# Patient Record
Sex: FEMALE | Race: BLACK OR AFRICAN AMERICAN | NOT HISPANIC OR LATINO | Employment: UNEMPLOYED | URBAN - METROPOLITAN AREA
[De-identification: names, ages, dates, MRNs, and addresses within clinical notes are randomized per-mention and may not be internally consistent; named-entity substitution may affect disease eponyms.]

---

## 2023-12-04 PROCEDURE — 99285 EMERGENCY DEPT VISIT HI MDM: CPT | Performed by: EMERGENCY MEDICINE

## 2023-12-05 ENCOUNTER — HOSPITAL ENCOUNTER (OUTPATIENT)
Facility: HOSPITAL | Age: 28
Setting detail: OBSERVATION
Discharge: LEFT BEFORE DISCHARGE | End: 2023-12-06
Attending: EMERGENCY MEDICINE | Admitting: INTERNAL MEDICINE
Payer: COMMERCIAL

## 2023-12-05 ENCOUNTER — APPOINTMENT (OUTPATIENT)
Dept: RADIOLOGY | Facility: HOSPITAL | Age: 28
End: 2023-12-05
Payer: COMMERCIAL

## 2023-12-05 ENCOUNTER — APPOINTMENT (OUTPATIENT)
Dept: NEUROLOGY | Facility: HOSPITAL | Age: 28
End: 2023-12-05
Payer: COMMERCIAL

## 2023-12-05 DIAGNOSIS — R41.82 ALTERED MENTAL STATUS, UNSPECIFIED ALTERED MENTAL STATUS TYPE: Primary | ICD-10-CM

## 2023-12-05 LAB
ALBUMIN SERPL BCP-MCNC: 3.4 G/DL (ref 3.4–5)
ALBUMIN SERPL BCP-MCNC: 3.6 G/DL (ref 3.4–5)
ALP SERPL-CCNC: 47 U/L (ref 33–110)
ALP SERPL-CCNC: 53 U/L (ref 33–110)
ALT SERPL W P-5'-P-CCNC: 6 U/L (ref 7–45)
ALT SERPL W P-5'-P-CCNC: 7 U/L (ref 7–45)
AMPHETAMINES UR QL SCN: ABNORMAL
ANION GAP BLDV CALCULATED.4IONS-SCNC: 8 MMOL/L (ref 10–25)
ANION GAP SERPL CALC-SCNC: 13 MMOL/L (ref 10–20)
ANION GAP SERPL CALC-SCNC: 7 MMOL/L (ref 10–20)
APAP SERPL-MCNC: <10 UG/ML
APPEARANCE UR: CLEAR
AST SERPL W P-5'-P-CCNC: 14 U/L (ref 9–39)
AST SERPL W P-5'-P-CCNC: 17 U/L (ref 9–39)
B-HCG SERPL-ACNC: <2 MIU/ML
BARBITURATES UR QL SCN: ABNORMAL
BASE EXCESS BLDV CALC-SCNC: 1.6 MMOL/L (ref -2–3)
BASOPHILS # BLD AUTO: 0.03 X10*3/UL (ref 0–0.1)
BASOPHILS NFR BLD AUTO: 0.5 %
BENZODIAZ UR QL SCN: ABNORMAL
BILIRUB SERPL-MCNC: 0.5 MG/DL (ref 0–1.2)
BILIRUB SERPL-MCNC: 0.8 MG/DL (ref 0–1.2)
BILIRUB UR STRIP.AUTO-MCNC: NEGATIVE MG/DL
BODY TEMPERATURE: 37 DEGREES CELSIUS
BUN SERPL-MCNC: 9 MG/DL (ref 6–23)
BUN SERPL-MCNC: 9 MG/DL (ref 6–23)
BZE UR QL SCN: ABNORMAL
CA-I BLDV-SCNC: 1.17 MMOL/L (ref 1.1–1.33)
CALCIUM SERPL-MCNC: 8.3 MG/DL (ref 8.6–10.3)
CALCIUM SERPL-MCNC: 8.5 MG/DL (ref 8.6–10.3)
CANNABINOIDS UR QL SCN: ABNORMAL
CHLORIDE BLDV-SCNC: 104 MMOL/L (ref 98–107)
CHLORIDE SERPL-SCNC: 104 MMOL/L (ref 98–107)
CHLORIDE SERPL-SCNC: 99 MMOL/L (ref 98–107)
CO2 SERPL-SCNC: 25 MMOL/L (ref 21–32)
CO2 SERPL-SCNC: 30 MMOL/L (ref 21–32)
COLOR UR: YELLOW
CREAT SERPL-MCNC: 0.68 MG/DL (ref 0.5–1.05)
CREAT SERPL-MCNC: 0.71 MG/DL (ref 0.5–1.05)
EOSINOPHIL # BLD AUTO: 0.03 X10*3/UL (ref 0–0.7)
EOSINOPHIL NFR BLD AUTO: 0.5 %
ERYTHROCYTE [DISTWIDTH] IN BLOOD BY AUTOMATED COUNT: 14.4 % (ref 11.5–14.5)
ERYTHROCYTE [DISTWIDTH] IN BLOOD BY AUTOMATED COUNT: 14.5 % (ref 11.5–14.5)
EST. AVERAGE GLUCOSE BLD GHB EST-MCNC: 103 MG/DL
FENTANYL+NORFENTANYL UR QL SCN: ABNORMAL
GFR SERPL CREATININE-BSD FRML MDRD: >90 ML/MIN/1.73M*2
GFR SERPL CREATININE-BSD FRML MDRD: >90 ML/MIN/1.73M*2
GLUCOSE BLD MANUAL STRIP-MCNC: 85 MG/DL (ref 74–99)
GLUCOSE BLDV-MCNC: 80 MG/DL (ref 74–99)
GLUCOSE SERPL-MCNC: 84 MG/DL (ref 74–99)
GLUCOSE SERPL-MCNC: 85 MG/DL (ref 74–99)
GLUCOSE UR STRIP.AUTO-MCNC: NEGATIVE MG/DL
HBA1C MFR BLD: 5.2 %
HCO3 BLDV-SCNC: 27.2 MMOL/L (ref 22–26)
HCT VFR BLD AUTO: 35.2 % (ref 36–46)
HCT VFR BLD AUTO: 37.9 % (ref 36–46)
HCT VFR BLD EST: 35 % (ref 36–46)
HGB BLD-MCNC: 10.9 G/DL (ref 12–16)
HGB BLD-MCNC: 11.6 G/DL (ref 12–16)
HGB BLDV-MCNC: 11.5 G/DL (ref 12–16)
HOLD SPECIMEN: NORMAL
IMM GRANULOCYTES # BLD AUTO: 0.02 X10*3/UL (ref 0–0.7)
IMM GRANULOCYTES NFR BLD AUTO: 0.3 % (ref 0–0.9)
INHALED O2 CONCENTRATION: 21 %
KETONES UR STRIP.AUTO-MCNC: NEGATIVE MG/DL
LACTATE BLDV-SCNC: 0.7 MMOL/L (ref 0.4–2)
LACTATE SERPL-SCNC: 0.6 MMOL/L (ref 0.4–2)
LEUKOCYTE ESTERASE UR QL STRIP.AUTO: NEGATIVE
LYMPHOCYTES # BLD AUTO: 1.82 X10*3/UL (ref 1.2–4.8)
LYMPHOCYTES NFR BLD AUTO: 30.7 %
MAGNESIUM SERPL-MCNC: 1.6 MG/DL (ref 1.6–2.4)
MAGNESIUM SERPL-MCNC: 1.6 MG/DL (ref 1.6–2.4)
MCH RBC QN AUTO: 26.6 PG (ref 26–34)
MCH RBC QN AUTO: 26.8 PG (ref 26–34)
MCHC RBC AUTO-ENTMCNC: 30.6 G/DL (ref 32–36)
MCHC RBC AUTO-ENTMCNC: 31 G/DL (ref 32–36)
MCV RBC AUTO: 87 FL (ref 80–100)
MCV RBC AUTO: 87 FL (ref 80–100)
MONOCYTES # BLD AUTO: 0.47 X10*3/UL (ref 0.1–1)
MONOCYTES NFR BLD AUTO: 7.9 %
NEUTROPHILS # BLD AUTO: 3.55 X10*3/UL (ref 1.2–7.7)
NEUTROPHILS NFR BLD AUTO: 60.1 %
NITRITE UR QL STRIP.AUTO: NEGATIVE
NRBC BLD-RTO: 0 /100 WBCS (ref 0–0)
NRBC BLD-RTO: 0 /100 WBCS (ref 0–0)
OPIATES UR QL SCN: ABNORMAL
OXYCODONE+OXYMORPHONE UR QL SCN: ABNORMAL
OXYHGB MFR BLDV: 82.3 % (ref 45–75)
PCO2 BLDV: 46 MM HG (ref 41–51)
PCP UR QL SCN: ABNORMAL
PH BLDV: 7.38 PH (ref 7.33–7.43)
PH UR STRIP.AUTO: 6.5 [PH]
PLATELET # BLD AUTO: 290 X10*3/UL (ref 150–450)
PLATELET # BLD AUTO: 298 X10*3/UL (ref 150–450)
PO2 BLDV: 52 MM HG (ref 35–45)
POTASSIUM BLDV-SCNC: 3.5 MMOL/L (ref 3.5–5.3)
POTASSIUM SERPL-SCNC: 3.5 MMOL/L (ref 3.5–5.3)
POTASSIUM SERPL-SCNC: 3.5 MMOL/L (ref 3.5–5.3)
PROT SERPL-MCNC: 5.8 G/DL (ref 6.4–8.2)
PROT SERPL-MCNC: 6.2 G/DL (ref 6.4–8.2)
PROT UR STRIP.AUTO-MCNC: ABNORMAL MG/DL
RBC # BLD AUTO: 4.07 X10*6/UL (ref 4–5.2)
RBC # BLD AUTO: 4.36 X10*6/UL (ref 4–5.2)
RBC # UR STRIP.AUTO: NEGATIVE /UL
RBC #/AREA URNS AUTO: NORMAL /HPF
SALICYLATES SERPL-MCNC: <3 MG/DL
SAO2 % BLDV: 84 % (ref 45–75)
SARS-COV-2 RNA RESP QL NAA+PROBE: NOT DETECTED
SODIUM BLDV-SCNC: 136 MMOL/L (ref 136–145)
SODIUM SERPL-SCNC: 132 MMOL/L (ref 136–145)
SODIUM SERPL-SCNC: 138 MMOL/L (ref 136–145)
SP GR UR STRIP.AUTO: <1.005
SQUAMOUS #/AREA URNS AUTO: NORMAL /HPF
UROBILINOGEN UR STRIP.AUTO-MCNC: ABNORMAL MG/DL
WBC # BLD AUTO: 5.5 X10*3/UL (ref 4.4–11.3)
WBC # BLD AUTO: 5.9 X10*3/UL (ref 4.4–11.3)
WBC #/AREA URNS AUTO: NORMAL /HPF

## 2023-12-05 PROCEDURE — 95819 EEG AWAKE AND ASLEEP: CPT

## 2023-12-05 PROCEDURE — 70498 CT ANGIOGRAPHY NECK: CPT | Performed by: STUDENT IN AN ORGANIZED HEALTH CARE EDUCATION/TRAINING PROGRAM

## 2023-12-05 PROCEDURE — 70496 CT ANGIOGRAPHY HEAD: CPT

## 2023-12-05 PROCEDURE — 96361 HYDRATE IV INFUSION ADD-ON: CPT | Performed by: INTERNAL MEDICINE

## 2023-12-05 PROCEDURE — 94760 N-INVAS EAR/PLS OXIMETRY 1: CPT

## 2023-12-05 PROCEDURE — 80053 COMPREHEN METABOLIC PANEL: CPT | Mod: 59 | Performed by: EMERGENCY MEDICINE

## 2023-12-05 PROCEDURE — 83735 ASSAY OF MAGNESIUM: CPT | Performed by: PHYSICIAN ASSISTANT

## 2023-12-05 PROCEDURE — 81001 URINALYSIS AUTO W/SCOPE: CPT | Performed by: EMERGENCY MEDICINE

## 2023-12-05 PROCEDURE — 80307 DRUG TEST PRSMV CHEM ANLYZR: CPT | Performed by: EMERGENCY MEDICINE

## 2023-12-05 PROCEDURE — 70496 CT ANGIOGRAPHY HEAD: CPT | Performed by: STUDENT IN AN ORGANIZED HEALTH CARE EDUCATION/TRAINING PROGRAM

## 2023-12-05 PROCEDURE — 83036 HEMOGLOBIN GLYCOSYLATED A1C: CPT | Mod: AHULAB | Performed by: PHYSICIAN ASSISTANT

## 2023-12-05 PROCEDURE — 85027 COMPLETE CBC AUTOMATED: CPT | Performed by: PHYSICIAN ASSISTANT

## 2023-12-05 PROCEDURE — 80179 DRUG ASSAY SALICYLATE: CPT | Performed by: EMERGENCY MEDICINE

## 2023-12-05 PROCEDURE — 84702 CHORIONIC GONADOTROPIN TEST: CPT | Performed by: EMERGENCY MEDICINE

## 2023-12-05 PROCEDURE — 83605 ASSAY OF LACTIC ACID: CPT | Performed by: EMERGENCY MEDICINE

## 2023-12-05 PROCEDURE — C9113 INJ PANTOPRAZOLE SODIUM, VIA: HCPCS | Performed by: PHYSICIAN ASSISTANT

## 2023-12-05 PROCEDURE — 82435 ASSAY OF BLOOD CHLORIDE: CPT | Performed by: PHYSICIAN ASSISTANT

## 2023-12-05 PROCEDURE — 96375 TX/PRO/DX INJ NEW DRUG ADDON: CPT | Mod: 59 | Performed by: INTERNAL MEDICINE

## 2023-12-05 PROCEDURE — 36415 COLL VENOUS BLD VENIPUNCTURE: CPT | Performed by: PHYSICIAN ASSISTANT

## 2023-12-05 PROCEDURE — 70551 MRI BRAIN STEM W/O DYE: CPT | Performed by: STUDENT IN AN ORGANIZED HEALTH CARE EDUCATION/TRAINING PROGRAM

## 2023-12-05 PROCEDURE — 2550000001 HC RX 255 CONTRASTS: Performed by: EMERGENCY MEDICINE

## 2023-12-05 PROCEDURE — 84132 ASSAY OF SERUM POTASSIUM: CPT | Mod: 59 | Performed by: PHYSICIAN ASSISTANT

## 2023-12-05 PROCEDURE — 80143 DRUG ASSAY ACETAMINOPHEN: CPT | Performed by: EMERGENCY MEDICINE

## 2023-12-05 PROCEDURE — G0378 HOSPITAL OBSERVATION PER HR: HCPCS

## 2023-12-05 PROCEDURE — 99221 1ST HOSP IP/OBS SF/LOW 40: CPT | Performed by: PSYCHIATRY & NEUROLOGY

## 2023-12-05 PROCEDURE — 36415 COLL VENOUS BLD VENIPUNCTURE: CPT | Performed by: EMERGENCY MEDICINE

## 2023-12-05 PROCEDURE — 2500000004 HC RX 250 GENERAL PHARMACY W/ HCPCS (ALT 636 FOR OP/ED): Performed by: PHYSICIAN ASSISTANT

## 2023-12-05 PROCEDURE — 70450 CT HEAD/BRAIN W/O DYE: CPT

## 2023-12-05 PROCEDURE — 82805 BLOOD GASES W/O2 SATURATION: CPT | Performed by: EMERGENCY MEDICINE

## 2023-12-05 PROCEDURE — 70551 MRI BRAIN STEM W/O DYE: CPT

## 2023-12-05 PROCEDURE — 82947 ASSAY GLUCOSE BLOOD QUANT: CPT

## 2023-12-05 PROCEDURE — 84295 ASSAY OF SERUM SODIUM: CPT | Performed by: EMERGENCY MEDICINE

## 2023-12-05 PROCEDURE — 83735 ASSAY OF MAGNESIUM: CPT | Performed by: EMERGENCY MEDICINE

## 2023-12-05 PROCEDURE — 80053 COMPREHEN METABOLIC PANEL: CPT | Performed by: EMERGENCY MEDICINE

## 2023-12-05 PROCEDURE — 99223 1ST HOSP IP/OBS HIGH 75: CPT | Performed by: PHYSICIAN ASSISTANT

## 2023-12-05 PROCEDURE — 85025 COMPLETE CBC W/AUTO DIFF WBC: CPT | Performed by: EMERGENCY MEDICINE

## 2023-12-05 PROCEDURE — 87635 SARS-COV-2 COVID-19 AMP PRB: CPT | Performed by: EMERGENCY MEDICINE

## 2023-12-05 PROCEDURE — 70498 CT ANGIOGRAPHY NECK: CPT

## 2023-12-05 RX ORDER — PANTOPRAZOLE SODIUM 40 MG/10ML
40 INJECTION, POWDER, LYOPHILIZED, FOR SOLUTION INTRAVENOUS
Status: DISCONTINUED | OUTPATIENT
Start: 2023-12-05 | End: 2023-12-06 | Stop reason: HOSPADM

## 2023-12-05 RX ORDER — TALC
3 POWDER (GRAM) TOPICAL
Status: DISCONTINUED | OUTPATIENT
Start: 2023-12-05 | End: 2023-12-06 | Stop reason: HOSPADM

## 2023-12-05 RX ORDER — DOCUSATE SODIUM 100 MG/1
100 CAPSULE, LIQUID FILLED ORAL 2 TIMES DAILY PRN
Status: DISCONTINUED | OUTPATIENT
Start: 2023-12-05 | End: 2023-12-06 | Stop reason: HOSPADM

## 2023-12-05 RX ORDER — PANTOPRAZOLE SODIUM 40 MG/1
40 TABLET, DELAYED RELEASE ORAL
Status: DISCONTINUED | OUTPATIENT
Start: 2023-12-05 | End: 2023-12-06 | Stop reason: HOSPADM

## 2023-12-05 RX ORDER — ACETAMINOPHEN 325 MG/1
950 TABLET ORAL EVERY 6 HOURS PRN
Status: DISCONTINUED | OUTPATIENT
Start: 2023-12-05 | End: 2023-12-06 | Stop reason: HOSPADM

## 2023-12-05 RX ADMIN — PANTOPRAZOLE SODIUM 40 MG: 40 INJECTION, POWDER, FOR SOLUTION INTRAVENOUS at 09:58

## 2023-12-05 RX ADMIN — IOHEXOL 75 ML: 350 INJECTION, SOLUTION INTRAVENOUS at 00:42

## 2023-12-05 RX ADMIN — SODIUM CHLORIDE 500 ML: 9 INJECTION, SOLUTION INTRAVENOUS at 13:05

## 2023-12-05 SDOH — SOCIAL STABILITY: SOCIAL INSECURITY: DO YOU FEEL ANYONE HAS EXPLOITED OR TAKEN ADVANTAGE OF YOU FINANCIALLY OR OF YOUR PERSONAL PROPERTY?: NO

## 2023-12-05 SDOH — ECONOMIC STABILITY: TRANSPORTATION INSECURITY
IN THE PAST 12 MONTHS, HAS LACK OF TRANSPORTATION KEPT YOU FROM MEETINGS, WORK, OR FROM GETTING THINGS NEEDED FOR DAILY LIVING?: NO

## 2023-12-05 SDOH — SOCIAL STABILITY: SOCIAL INSECURITY: HAS ANYONE EVER THREATENED TO HURT YOUR FAMILY OR YOUR PETS?: NO

## 2023-12-05 SDOH — ECONOMIC STABILITY: HOUSING INSECURITY
IN THE LAST 12 MONTHS, WAS THERE A TIME WHEN YOU DID NOT HAVE A STEADY PLACE TO SLEEP OR SLEPT IN A SHELTER (INCLUDING NOW)?: NO

## 2023-12-05 SDOH — ECONOMIC STABILITY: INCOME INSECURITY: IN THE LAST 12 MONTHS, WAS THERE A TIME WHEN YOU WERE NOT ABLE TO PAY THE MORTGAGE OR RENT ON TIME?: NO

## 2023-12-05 SDOH — SOCIAL STABILITY: SOCIAL INSECURITY: DO YOU FEEL UNSAFE GOING BACK TO THE PLACE WHERE YOU ARE LIVING?: NO

## 2023-12-05 SDOH — SOCIAL STABILITY: SOCIAL INSECURITY: ARE YOU OR HAVE YOU BEEN THREATENED OR ABUSED PHYSICALLY, EMOTIONALLY, OR SEXUALLY BY ANYONE?: NO

## 2023-12-05 SDOH — SOCIAL STABILITY: SOCIAL INSECURITY: DOES ANYONE TRY TO KEEP YOU FROM HAVING/CONTACTING OTHER FRIENDS OR DOING THINGS OUTSIDE YOUR HOME?: NO

## 2023-12-05 SDOH — ECONOMIC STABILITY: GENERAL
WHICH OF THE FOLLOWING DO YOU KNOW HOW TO USE AND HAVE ACCESS TO EVERY DAY? (CHOOSE ALL THAT APPLY): DESKTOP COMPUTER, LAPTOP COMPUTER, OR TABLET WITH BROADBAND INTERNET CONNECTION;SMARTPHONE WITH CELLULAR DATA PLAN

## 2023-12-05 SDOH — ECONOMIC STABILITY: HOUSING INSECURITY: IN THE LAST 12 MONTHS, HOW MANY PLACES HAVE YOU LIVED?: 1

## 2023-12-05 SDOH — SOCIAL STABILITY: SOCIAL INSECURITY: HAVE YOU HAD THOUGHTS OF HARMING ANYONE ELSE?: NO

## 2023-12-05 SDOH — ECONOMIC STABILITY: TRANSPORTATION INSECURITY
IN THE PAST 12 MONTHS, HAS THE LACK OF TRANSPORTATION KEPT YOU FROM MEDICAL APPOINTMENTS OR FROM GETTING MEDICATIONS?: NO

## 2023-12-05 SDOH — ECONOMIC STABILITY: GENERAL
WHICH OF THE FOLLOWING WOULD YOU LIKE TO GET CONNECTED TO IN ORDER TO RECEIVE A DISCOUNT OR FOR FREE? (CHOOSE ALL THAT APPLY): NONE OF THESE

## 2023-12-05 SDOH — SOCIAL STABILITY: SOCIAL INSECURITY: ARE THERE ANY APPARENT SIGNS OF INJURIES/BEHAVIORS THAT COULD BE RELATED TO ABUSE/NEGLECT?: NO

## 2023-12-05 SDOH — SOCIAL STABILITY: SOCIAL INSECURITY: ABUSE: ADULT

## 2023-12-05 ASSESSMENT — COGNITIVE AND FUNCTIONAL STATUS - GENERAL
PERSONAL GROOMING: A LITTLE
MOBILITY SCORE: 12
MOVING FROM LYING ON BACK TO SITTING ON SIDE OF FLAT BED WITH BEDRAILS: A LITTLE
DRESSING REGULAR UPPER BODY CLOTHING: A LITTLE
PERSONAL GROOMING: A LITTLE
DRESSING REGULAR LOWER BODY CLOTHING: A LITTLE
TURNING FROM BACK TO SIDE WHILE IN FLAT BAD: A LITTLE
MOVING TO AND FROM BED TO CHAIR: A LOT
CLIMB 3 TO 5 STEPS WITH RAILING: TOTAL
WALKING IN HOSPITAL ROOM: TOTAL
DRESSING REGULAR UPPER BODY CLOTHING: A LITTLE
MOVING FROM LYING ON BACK TO SITTING ON SIDE OF FLAT BED WITH BEDRAILS: A LITTLE
TURNING FROM BACK TO SIDE WHILE IN FLAT BAD: A LITTLE
CLIMB 3 TO 5 STEPS WITH RAILING: TOTAL
MOVING TO AND FROM BED TO CHAIR: A LOT
WALKING IN HOSPITAL ROOM: TOTAL
HELP NEEDED FOR BATHING: A LITTLE
DAILY ACTIVITIY SCORE: 19
TOILETING: A LITTLE
TOILETING: A LITTLE
STANDING UP FROM CHAIR USING ARMS: A LOT
STANDING UP FROM CHAIR USING ARMS: A LOT
DAILY ACTIVITIY SCORE: 19
HELP NEEDED FOR BATHING: A LITTLE
PATIENT BASELINE BEDBOUND: NO
DRESSING REGULAR LOWER BODY CLOTHING: A LITTLE
MOBILITY SCORE: 12

## 2023-12-05 ASSESSMENT — ACTIVITIES OF DAILY LIVING (ADL)
DRESSING YOURSELF: NEEDS ASSISTANCE
LACK_OF_TRANSPORTATION: NO
TOILETING: NEEDS ASSISTANCE
HEARING - LEFT EAR: FUNCTIONAL
GROOMING: NEEDS ASSISTANCE
PATIENT'S MEMORY ADEQUATE TO SAFELY COMPLETE DAILY ACTIVITIES?: NO
ADEQUATE_TO_COMPLETE_ADL: YES
BATHING: NEEDS ASSISTANCE
LACK_OF_TRANSPORTATION: NO
WALKS IN HOME: INDEPENDENT
HEARING - RIGHT EAR: FUNCTIONAL
FEEDING YOURSELF: NEEDS ASSISTANCE
JUDGMENT_ADEQUATE_SAFELY_COMPLETE_DAILY_ACTIVITIES: NO

## 2023-12-05 ASSESSMENT — LIFESTYLE VARIABLES
SKIP TO QUESTIONS 9-10: 1
AUDIT-C TOTAL SCORE: 1
HOW MANY STANDARD DRINKS CONTAINING ALCOHOL DO YOU HAVE ON A TYPICAL DAY: 1 OR 2
AUDIT-C TOTAL SCORE: 1
HOW OFTEN DO YOU HAVE 6 OR MORE DRINKS ON ONE OCCASION: NEVER
HOW OFTEN DO YOU HAVE A DRINK CONTAINING ALCOHOL: MONTHLY OR LESS

## 2023-12-05 ASSESSMENT — PAIN SCALES - GENERAL
PAINLEVEL_OUTOF10: 0 - NO PAIN
PAINLEVEL_OUTOF10: 0 - NO PAIN

## 2023-12-05 ASSESSMENT — PAIN - FUNCTIONAL ASSESSMENT
PAIN_FUNCTIONAL_ASSESSMENT: 0-10
PAIN_FUNCTIONAL_ASSESSMENT: UNABLE TO SELF-REPORT
PAIN_FUNCTIONAL_ASSESSMENT: 0-10

## 2023-12-05 ASSESSMENT — ENCOUNTER SYMPTOMS: CONFUSION: 1

## 2023-12-05 ASSESSMENT — PATIENT HEALTH QUESTIONNAIRE - PHQ9
2. FEELING DOWN, DEPRESSED OR HOPELESS: NOT AT ALL
SUM OF ALL RESPONSES TO PHQ9 QUESTIONS 1 & 2: 0
1. LITTLE INTEREST OR PLEASURE IN DOING THINGS: NOT AT ALL

## 2023-12-05 ASSESSMENT — COLUMBIA-SUICIDE SEVERITY RATING SCALE - C-SSRS
1. IN THE PAST MONTH, HAVE YOU WISHED YOU WERE DEAD OR WISHED YOU COULD GO TO SLEEP AND NOT WAKE UP?: NO
6. HAVE YOU EVER DONE ANYTHING, STARTED TO DO ANYTHING, OR PREPARED TO DO ANYTHING TO END YOUR LIFE?: NO
2. HAVE YOU ACTUALLY HAD ANY THOUGHTS OF KILLING YOURSELF?: NO

## 2023-12-05 NOTE — ED TRIAGE NOTES
1830 pm lkw. 2130 sx witnessed by family. 85 bgl, vitals wnl. Dilated pupils, reactive to light. A&ox1. No hx of seizures, peed herself. Unable to speak but seems like she wants to. No allergies. Family at bedside showed a bottle of modafinil and a antidepressant. Bilateral arm weakness, aphasia, some weakness noted in right leg. No facial droop.

## 2023-12-05 NOTE — PROGRESS NOTES
Mariann Dave is a 28 y.o. female on day 0 of admission presenting with AMS (altered mental status).    Subjective   Patient found trying to sit up in bed, however unable to do so. She is leaning forward over her breakfast. She appears confused, but is able to tell me where she is, what day of the week it is, and what the date is. She mentions that she works in [employer] for a [employer]. She does think her speech sounds different to her. She tells me she took a benzodiazapene, but also mention she took something intranasally which she calls [facility] or FTE? She says it is a 'research drug', either snorted or taken intranasally.        Objective     Physical Exam  Constitutional:       General: She is not in acute distress.     Appearance: She is not toxic-appearing.   HENT:      Head: Normocephalic and atraumatic.      Right Ear: External ear normal.      Left Ear: External ear normal.      Nose: Nose normal. No congestion.      Mouth/Throat:      Mouth: Mucous membranes are moist.      Pharynx: Oropharynx is clear.   Eyes:      General:         Right eye: No discharge.         Left eye: No discharge.      Conjunctiva/sclera: Conjunctivae normal.      Pupils: Pupils are equal, round, and reactive to light.   Cardiovascular:      Rate and Rhythm: Normal rate and regular rhythm.      Heart sounds: No murmur heard.     No gallop.   Pulmonary:      Effort: Pulmonary effort is normal.      Breath sounds: No wheezing or rales.   Abdominal:      General: Abdomen is flat. Bowel sounds are normal.      Palpations: Abdomen is soft.      Tenderness: There is no abdominal tenderness. There is no guarding or rebound.   Musculoskeletal:         General: No swelling or tenderness. Normal range of motion.      Right lower leg: No edema.      Left lower leg: No edema.   Skin:     General: Skin is warm and dry.      Findings: No erythema or rash.   Neurological:      General: No focal deficit present.      Mental Status: She is  "alert.      Cranial Nerves: No cranial nerve deficit.      Sensory: No sensory deficit.      Motor: Weakness present.      Coordination: Coordination abnormal.      Comments: Patient is alert and oriented to person, place and time. However, she has limited recall of events leading up to her hospitalization. Unsure how she got to ED or who took her here. Unsure why she is in ED. Mentation delayed. Slurred speech noted. Head turn and shoulder shrug intact but delayed. PERRL, no nystagmus noted, difficulty with convergence. Weakness noted bilaterally upper and lower extremities, does not lateralize.    Psychiatric:         Mood and Affect: Mood normal.         Thought Content: Thought content normal.         Last Recorded Vitals  Blood pressure 109/83, pulse 84, temperature 36.7 °C (98 °F), temperature source Temporal, resp. rate 21, height 1.575 m (5' 2\"), weight 72.6 kg (160 lb), SpO2 99 %.  Intake/Output last 3 Shifts:  No intake/output data recorded.    Relevant Results              Scheduled medications  melatonin, 3 mg, oral, Daily  pantoprazole, 40 mg, oral, Daily before breakfast   Or  pantoprazole, 40 mg, intravenous, Daily before breakfast      Continuous medications     PRN medications  PRN medications: acetaminophen, docusate sodium  Results for orders placed or performed during the hospital encounter of 12/05/23 (from the past 24 hour(s))   POCT GLUCOSE   Result Value Ref Range    POCT Glucose 85 74 - 99 mg/dL   CBC and Auto Differential   Result Value Ref Range    WBC 5.9 4.4 - 11.3 x10*3/uL    nRBC 0.0 0.0 - 0.0 /100 WBCs    RBC 4.07 4.00 - 5.20 x10*6/uL    Hemoglobin 10.9 (L) 12.0 - 16.0 g/dL    Hematocrit 35.2 (L) 36.0 - 46.0 %    MCV 87 80 - 100 fL    MCH 26.8 26.0 - 34.0 pg    MCHC 31.0 (L) 32.0 - 36.0 g/dL    RDW 14.4 11.5 - 14.5 %    Platelets 298 150 - 450 x10*3/uL    Neutrophils % 60.1 40.0 - 80.0 %    Immature Granulocytes %, Automated 0.3 0.0 - 0.9 %    Lymphocytes % 30.7 13.0 - 44.0 %    " Monocytes % 7.9 2.0 - 10.0 %    Eosinophils % 0.5 0.0 - 6.0 %    Basophils % 0.5 0.0 - 2.0 %    Neutrophils Absolute 3.55 1.20 - 7.70 x10*3/uL    Immature Granulocytes Absolute, Automated 0.02 0.00 - 0.70 x10*3/uL    Lymphocytes Absolute 1.82 1.20 - 4.80 x10*3/uL    Monocytes Absolute 0.47 0.10 - 1.00 x10*3/uL    Eosinophils Absolute 0.03 0.00 - 0.70 x10*3/uL    Basophils Absolute 0.03 0.00 - 0.10 x10*3/uL   Comprehensive metabolic panel   Result Value Ref Range    Glucose 85 74 - 99 mg/dL    Sodium 132 (L) 136 - 145 mmol/L    Potassium 3.5 3.5 - 5.3 mmol/L    Chloride 99 98 - 107 mmol/L    Bicarbonate 30 21 - 32 mmol/L    Anion Gap 7 (L) 10 - 20 mmol/L    Urea Nitrogen 9 6 - 23 mg/dL    Creatinine 0.68 0.50 - 1.05 mg/dL    eGFR >90 >60 mL/min/1.73m*2    Calcium 8.3 (L) 8.6 - 10.3 mg/dL    Albumin 3.4 3.4 - 5.0 g/dL    Alkaline Phosphatase 47 33 - 110 U/L    Total Protein 5.8 (L) 6.4 - 8.2 g/dL    AST 14 9 - 39 U/L    Bilirubin, Total 0.5 0.0 - 1.2 mg/dL    ALT 6 (L) 7 - 45 U/L   Magnesium   Result Value Ref Range    Magnesium 1.60 1.60 - 2.40 mg/dL   Blood Gas Venous Full Panel   Result Value Ref Range    POCT pH, Venous 7.38 7.33 - 7.43 pH    POCT pCO2, Venous 46 41 - 51 mm Hg    POCT pO2, Venous 52 (H) 35 - 45 mm Hg    POCT SO2, Venous 84 (H) 45 - 75 %    POCT Oxy Hemoglobin, Venous 82.3 (H) 45.0 - 75.0 %    POCT Hematocrit Calculated, Venous 35.0 (L) 36.0 - 46.0 %    POCT Sodium, Venous 136 136 - 145 mmol/L    POCT Potassium, Venous 3.5 3.5 - 5.3 mmol/L    POCT Chloride, Venous 104 98 - 107 mmol/L    POCT Ionized Calicum, Venous 1.17 1.10 - 1.33 mmol/L    POCT Glucose, Venous 80 74 - 99 mg/dL    POCT Lactate, Venous 0.7 0.4 - 2.0 mmol/L    POCT Base Excess, Venous 1.6 -2.0 - 3.0 mmol/L    POCT HCO3 Calculated, Venous 27.2 (H) 22.0 - 26.0 mmol/L    POCT Hemoglobin, Venous 11.5 (L) 12.0 - 16.0 g/dL    POCT Anion Gap, Venous 8.0 (L) 10.0 - 25.0 mmol/L    Patient Temperature 37.0 degrees Celsius    FiO2 21 %    Lactate   Result Value Ref Range    Lactate 0.6 0.4 - 2.0 mmol/L   Human Chorionic Gonadotropin, Serum Quantitative   Result Value Ref Range    HCG, Beta-Quantitative <2 <5 mIU/mL   Salicylate level   Result Value Ref Range    Salicylate  <3 4 - 20 mg/dL   Acetaminophen level   Result Value Ref Range    Acetaminophen <10.0 10.0 - 30.0 ug/mL   Light Blue Top   Result Value Ref Range    Extra Tube Hold for add-ons.    SARS-CoV-2 RT PCR   Result Value Ref Range    Coronavirus 2019, PCR Not Detected Not Detected   Urinalysis with Reflex Microscopic   Result Value Ref Range    Color, Urine Yellow Straw, Yellow    Appearance, Urine Clear Clear    Specific Gravity, Urine <1.005 (A) 1.005 - 1.035    pH, Urine 6.5 5.0, 5.5, 6.0, 6.5, 7.0, 7.5, 8.0    Protein, Urine 30 (1+) (A) NEGATIVE, 10 (TRACE) mg/dL    Glucose, Urine NEGATIVE NEGATIVE mg/dL    Blood, Urine NEGATIVE NEGATIVE    Ketones, Urine NEGATIVE NEGATIVE mg/dL    Bilirubin, Urine NEGATIVE NEGATIVE    Urobilinogen, Urine NORM NORM mg/dL    Nitrite, Urine NEGATIVE NEGATIVE    Leukocyte Esterase, Urine NEGATIVE NEGATIVE   Drug Screen, Urine   Result Value Ref Range    Amphetamine Screen, Urine Presumptive Negative Presumptive Negative    Barbiturate Screen, Urine Presumptive Negative Presumptive Negative    Benzodiazepines Screen, Urine Presumptive Positive (A) Presumptive Negative    Cannabinoid Screen, Urine Presumptive Positive (A) Presumptive Negative    Cocaine Metabolite Screen, Urine Presumptive Negative Presumptive Negative    Fentanyl Screen, Urine Presumptive Negative Presumptive Negative    Opiate Screen, Urine Presumptive Negative Presumptive Negative    Oxycodone Screen, Urine Presumptive Negative Presumptive Negative    PCP Screen, Urine Presumptive Negative Presumptive Negative   Microscopic Only, Urine   Result Value Ref Range    WBC, Urine NONE 1-5, NONE /HPF    RBC, Urine 1-2 NONE, 1-2, 3-5 /HPF    Squamous Epithelial Cells, Urine 1-9 (SPARSE)  Reference range not established. /HPF     CT angio brain attack head w IV contrast and post procedure    Result Date: 12/5/2023  Interpreted By:  Oskar Oakes, STUDY: CT ANGIO BRAIN ATTACK NECK W IV CONTRAST AND POST PROCEDURE; CT ANGIO BRAIN ATTACK HEAD W IV CONTRAST AND POST PROCEDURE;  12/5/2023 12:43 am   INDICATION: Signs/Symptoms:stroke alert, unable to speak or stand.   COMPARISON: None.   ACCESSION NUMBER(S): [id number]; [id number]   ORDERING CLINICIAN: DONNA MARTIN   TECHNIQUE: After 75 mL of Omnipaque 350 was administered intravenously and axial images of the head and neck were acquired.  Coronal, sagittal, and 3-D reconstructions were provided for review.   FINDINGS: Evaluation of the neck is somewhat degraded by beam hardening artifact from the contrast bolus in the left jugular vein and in the venous plexus surrounding of the cervical spine.   CTA HEAD FINDINGS:   Intracranial carotid arteries demonstrate symmetric opacification bilaterally, without evidence of focal vessel occlusion or stenosis.   The anterior cerebral arteries are symmetric in appearance bilaterally, without evidence of focal vessel occlusion.   Middle cerebral arteries are symmetric in appearance, without evidence of high-grade stenosis in the proximal M1 segments of the stenosis in the more distal M2 and M3 branches.   Intracranial vertebral arteries demonstrate symmetric opacification bilaterally, without evidence of focal occlusion. The basilar artery somewhat diminutive in appearance, likely due to code dominant supply of the posterior cerebral arteries by the posterior communicating arteries bilaterally. No focal occlusion is identified in the posterior cerebral arteries bilaterally.   No enhancing masses or vascular malformations are identified.   CTA NECK FINDINGS:   Within limitations of the study described above, a three-vessel aortic arch is present. No significant atherosclerotic changes are  noted in the aortic arch or origin of the great vessels.   Visualized common carotid arteries demonstrate symmetric opacification bilaterally without evidence of stenosis or atherosclerotic changes.   Extracranial vertebral arteries are not well seen proximally due to beam hardening artifact from the contrast bolus in the left jugular vein in the cervical venous plexus. More distal segments of the extracranial vertebral arteries demonstrate symmetric opacification without evidence of occlusion.   Prevertebral and paraspinal soft tissues do not demonstrate any acute abnormalities.   Included lung apices are clear.   Visualized cervical spine is unremarkable in appearance.       1.  No evidence of large vessel occlusion is identified in the proximal intracranial circulation. 2. Within limitation of the study is somewhat degraded by beam hardening artifact from the contrast bolus in the left jugular vein in the cervical venous plexus, no significant stenosis or large vessel occlusion is identified in the large arteries of the neck.   MACRO: None   Signed by: Oskar Oakes 12/5/2023 1:09 AM Dictation workstation:   [id number]    CT angio brain attack neck w IV contrast and post procedure    Result Date: 12/5/2023  Interpreted By:  Oskar Oakes, STUDY: CT ANGIO BRAIN ATTACK NECK W IV CONTRAST AND POST PROCEDURE; CT ANGIO BRAIN ATTACK HEAD W IV CONTRAST AND POST PROCEDURE;  12/5/2023 12:43 am   INDICATION: Signs/Symptoms:stroke alert, unable to speak or stand.   COMPARISON: None.   ACCESSION NUMBER(S): [id number]; [id number]   ORDERING CLINICIAN: DONNA MARTIN   TECHNIQUE: After 75 mL of Omnipaque 350 was administered intravenously and axial images of the head and neck were acquired.  Coronal, sagittal, and 3-D reconstructions were provided for review.   FINDINGS: Evaluation of the neck is somewhat degraded by beam hardening artifact from the contrast bolus in the left jugular vein and  in the venous plexus surrounding of the cervical spine.   CTA HEAD FINDINGS:   Intracranial carotid arteries demonstrate symmetric opacification bilaterally, without evidence of focal vessel occlusion or stenosis.   The anterior cerebral arteries are symmetric in appearance bilaterally, without evidence of focal vessel occlusion.   Middle cerebral arteries are symmetric in appearance, without evidence of high-grade stenosis in the proximal M1 segments of the stenosis in the more distal M2 and M3 branches.   Intracranial vertebral arteries demonstrate symmetric opacification bilaterally, without evidence of focal occlusion. The basilar artery somewhat diminutive in appearance, likely due to code dominant supply of the posterior cerebral arteries by the posterior communicating arteries bilaterally. No focal occlusion is identified in the posterior cerebral arteries bilaterally.   No enhancing masses or vascular malformations are identified.   CTA NECK FINDINGS:   Within limitations of the study described above, a three-vessel aortic arch is present. No significant atherosclerotic changes are noted in the aortic arch or origin of the great vessels.   Visualized common carotid arteries demonstrate symmetric opacification bilaterally without evidence of stenosis or atherosclerotic changes.   Extracranial vertebral arteries are not well seen proximally due to beam hardening artifact from the contrast bolus in the left jugular vein in the cervical venous plexus. More distal segments of the extracranial vertebral arteries demonstrate symmetric opacification without evidence of occlusion.   Prevertebral and paraspinal soft tissues do not demonstrate any acute abnormalities.   Included lung apices are clear.   Visualized cervical spine is unremarkable in appearance.       1.  No evidence of large vessel occlusion is identified in the proximal intracranial circulation. 2. Within limitation of the study is somewhat degraded by  beam hardening artifact from the contrast bolus in the left jugular vein in the cervical venous plexus, no significant stenosis or large vessel occlusion is identified in the large arteries of the neck.   MACRO: None   Signed by: Oskar Oakes 12/5/2023 1:09 AM Dictation workstation:   [id number]    CT brain attack head wo IV contrast    Result Date: 12/5/2023  Interpreted By:  Oskar Oakes, STUDY: CT BRAIN ATTACK HEAD WO IV CONTRAST;  12/5/2023 12:32 am   INDICATION: Signs/Symptoms:weakness, stroke alert.   COMPARISON: None.   ACCESSION NUMBER(S): [id number]   ORDERING CLINICIAN: DONNA MARTIN   TECHNIQUE: Noncontrast axial CT scan of head was performed. Angled reformats in brain and bone windows were generated. The images were reviewed in bone, brain, blood and soft tissue windows.   FINDINGS: No hyperdense intracranial hemorrhage is evident. There is no mass effect or midline shift.   Gray-white differentiation is intact, without evidence of CT apparent transcortical infarct.   No ventricular dilatation is present. Basal cisterns are patent. No extra-axial fluid collections are identified.   Scalp soft tissues do not demonstrate any acute abnormalities. Calvarium is unremarkable in appearance. Mastoid air cells and middle ear cavities are well aerated.   Visualized paranasal sinuses are unremarkable in appearance.       No evidence of hemorrhage, CT apparent transcortical infarct, or other acute intracranial abnormality.   MACRO: Oskar Oakes discussed the significance and urgency of this critical finding by Epic chat with  DONNA MARTIN on 12/5/2023 at 12:42 am.  (**-RCF-**) Findings:  See findings.   Signed by: Oskar Oakes 12/5/2023 12:49 AM Dictation workstation:   [id number]                  Assessment/Plan   Principal Problem:    AMS (altered mental status)    AMS (altered mental status)  -Delayed mentation, slurred/unclear speech,  generalized weakness, impaired balance. However no focal neurological deficits noted. Patient denies falls or trauma. Denies fever chills headache.   -labs essentially unremarkable  -utox positive marijuana and benzos  -CTH and CTA negative for acute processes  -MRI brain pending  -suspect AMS due to substance use ([facility] FTE per patient)  -neurology consult pending   -GI ppx: Protonix  -VTE ppx: Ambulation           I spent 40 minutes in the professional and overall care of this patient.      Benedicto Levi PA-C

## 2023-12-05 NOTE — CARE PLAN
Pt  comes to the  ED  with  AMS.    SW  met with pt  briefly  at  bedside  to assess  dc planning needs. Pt  does appear  confused, sleepy but per PA she is able to say where she is and what  day it is.     Pt  reports she lives  at  home with her  parents. She is ind with ADLs and IADLs, drives, works. She has  a  dx of  depression. She denies  that there is  a  PCP following her in  the  community.     Per  chart  review  pt's mother  was able to walk the patient   to the  bathroom while in the ED. Pt  will need  ADLs and IADLs  assessed to determine  dispo. [facility] has  [facility].     Tcc to follow. Pt has a  neuro consult pending. Tox screen did show positive  for THC  and  benzos.     Barbara Jimenez, MSW, LSW

## 2023-12-05 NOTE — ED PROVIDER NOTES
HPI   Chief Complaint   Patient presents with    Altered Mental Status       HPI  Patient is a 28-year-old female with a past medical history significant for depression currently on modafinil and fluoxetine who is at baseline alert and oriented x 3 without neurologic deficits and presents emergency room for altered mental status.  According to family members patient was in her usual state of health at 1830 last night.  Family then found her at 2130 laying in bed with her eyes open, large pupils, not responding.  She had urinated on herself.  They tried to sit her up and she was slumping to the right.  They thought that her blood sugar might be low so they gave her juice which she was able to swallow.  She continued to act in this way with significantly slurred speech and decreased interaction so they had to carry her to the car to bring her to the emergency room.  This is never happened to her before.  She is currently on fluoxetine 40 mg and modafinil 200 mg but they have not noted any missing pills, any other accessible medications.  She does not have a history of overdose and has not been significantly depressed, making threats of overdose or showing any signs of concern that she might do this.  She has not been ill in any way and was without complaints prior to this      PMHx: Depression  PSHx: Denies  FamilyHx: Denies  SocialHx: History of occasional alcohol use but no alcohol abuse according to family  Allergies: NKDA per family  Medications: See Medication Reconciliation     ROS  As above but otherwise unable to obtain      Physical Exam    GENERAL: Awake and Alert, No Acute Distress but appears confused  HEENT: AT/NC, bilateral pupillary dilatation, EOMI, Normal Oropharynx, No Signs of Dehydration  NECK: Normal Inspection, No JVD  CARDIOVASCULAR: RRR, No M/R/G  RESPIRATORY: CTA Bilaterally, No Wheezes, Rales or Rhonchi, Chest Wall Non-tender  ABDOMEN: Soft, non-tender abdomen, Normal Bowel Sounds, No  Distention  BACK: No CVA Tenderness  SKIN: Normal Color, Warm, Dry, No Rashes   EXTREMITIES: Non-Tender, Full ROM, No Pedal Edema  NEURO: Significantly slurred and intelligible and aphasic speech, Normal Motor and Sensation    Nursing Assessment and Vitals Reviewed    EKG showed normal sinus rhythm at 70 bpm.  There are no significant interval prolongations or ischemic ST changes.  There are T wave inversions in lead III and V2.    Medical Decision  Patient is a 28-year-old female with a past medical history significant for depression but otherwise no other significant history who presented for altered mental status, slurred speech, aphasia.  Last known well was 1830.  At 2130 she was found in bed with her eyes open, bilateral pupillary dilatation, not responding to family members with urine all over her bed sheets.  She was slumping to the right and was given juice which she was okay to swallow but then could not ambulate, respond or follow commands was carried to the car and into the ED by family members.  She is currently on fluoxetine and modafinil and manages her own medications but family did not note any spilled pills, missing pills from the bottle and patient has not been making threats about overdosing.  They have not noted any signs of concern about self-injurious behavior.  I saw patient in triage at 1220 at which time I noted that she was alert but confused, her speech is aphasic and she is able to very briefly state her name which is significantly dysarthric and barely audible.  She has bilateral pupillary dilatation which is equal.  She is able to follow some commands for me and does not show signs of focal weakness.  Given significant altered mental status a stroke alert was activated at 0020.  Patient is not a candidate for TNK at this time as she is well outside the window.  Additionally, I did contact poison control and case of overdose as patient does appear to be suffering from a toxidrome.   Supportive care is a recommendation and evaluation for possible QTc prolongation.  EKG is already ordered for when patient returns from CT.  Additionally, MRIs are also ordered at this time.  There is concern for possible new onset seizure given the urination.  No history of seizures per family.    Workup for patient so far included labs that revealed mild hyponatremia and hypocalcemia.  Hemoglobin is 10.9 she has no leukocytosis.  pH is within normal limits and lactate is normal.  Salicylate, acetaminophen within normal limits.  Alcohol and urine studies pending at this time as mother was able to walk patient to the bathroom to urinate without alerting nurse.  CT of the head showed no evidence of large vessel occlusion and CTA with limitations of artifact did not show significant stenosis or large vessel occlusion of the neck.  CT head Noncon showed no evidence of acute intracranial pathology.  Patient was able to ambulate to the bathroom with some assistance but according to mom this was improved from having to carry her to the car.  Evaluation patient is somnolent but arousable and still nonfocal.  At this time anticipate patient will be admitted owing to ongoing altered mental status.  She is signed out to oncoming physician pending straight cath for urine sampling, recommendation by utilization management for admission.  MRI is ordered and pending.                            No data recorded                Patient History   No past medical history on file.  No past surgical history on file.  No family history on file.  Social History     Tobacco Use    Smoking status: Not on file    Smokeless tobacco: Not on file   Substance Use Topics    Alcohol use: Not on file    Drug use: Not on file       Physical Exam   ED Triage Vitals   Temp Pulse Resp BP   -- -- -- --      SpO2 Temp src Heart Rate Source Patient Position   -- -- -- --      BP Location FiO2 (%)     -- --       Physical Exam    ED Course & MDM         Medical Decision Making      Procedure  Procedures     Erlinda Lares MD  12/05/23 0224       Erlinda Lares MD  12/05/23 0225

## 2023-12-05 NOTE — PROGRESS NOTES
Pharmacy Medication History Review    Mariann Dave is a 28 y.o. female admitted for AMS (altered mental status). Pharmacy reviewed the patient's [id number] medications and allergies for accuracy.    The list below reflectives the updated PTA list. Please review each medication in order reconciliation for additional clarification and justification.  (Not in a hospital admission)       The list below reflectives the updated allergy list. Please review each documented allergy for additional clarification and justification.  Allergies  Reviewed by Maricruz Ragsdale, EMT on 12/5/2023   No Known Allergies         Below are additional concerns with the patient's PTA list.   Tried to talk to the patient, called her mother and searched any notes or pharmacy fills. I believe the patient takes no meds currently      Marcy Zaidi CPhT

## 2023-12-05 NOTE — PROGRESS NOTES
Mariann Dave is a 28 y.o. female on day 0 of admission presenting with AMS (altered mental status).       12/05/23 1319   Discharge Planning   Living Arrangements Family members   Support Systems Family members;Friends/neighbors   Assistance Needed typically ind with ADLs and IADLs   Type of Residence Private residence   Who is requesting discharge planning? Provider   Patient expects to be discharged to: home   Does the patient need discharge transport arranged? Yes   RoundTrip coordination needed? Yes   Financial Resource Strain   How hard is it for you to pay for the very basics like food, housing, medical care, and heating? Patient refu   Housing Stability   In the last 12 months, was there a time when you were not able to pay the mortgage or rent on time? N   In the last 12 months, how many places have you lived? 1   In the last 12 months, was there a time when you did not have a steady place to sleep or slept in a shelter (including now)? N   Transportation Needs   In the past 12 months, has lack of transportation kept you from medical appointments or from getting medications? no   In the past 12 months, has lack of transportation kept you from meetings, work, or from getting things needed for daily living? No       LUZMA Laird

## 2023-12-05 NOTE — CONSULTS
Inpatient consult to Neurology  Consult performed by: Eloy Levi MD  Consult ordered by: Benedicto Levi PA-C          History Of Present Illness  Mariann Dave is a 28 y.o. female presenting with acute encephalopathy.    She has past medical history significant for depression, maintained on fluoxetine and modafinil, and chart does not indicate other medical history.    She presented to the [facility] ED last night after being noted by family to be lying in bed poorly responsive, eyes open, pupils dilated, at 9:30 PM, having last been seen in her usual state of health at 6:30 PM.  She had loss of bladder continence.  She was slumping to the right on attempts to sit her up.    In addition to what was found on the urine tox screen there was possibly another ingestion, intranasal, per today's progress note.    On presentation to the ED she was afebrile.  Blood pressure 122/81.  Pulse 78/min.  Pulse ox 99% on room air.    Labs showed normal white count of 5.9.  Negative urine hCG.  Urine tox screen positive for benzodiazepines and cannabinoids.  Slight hyponatremia of 132, normal glucose of 85, borderline potassium of 3.5, normal BUN and creatinine, normal liver function studies.  Negative COVID screen.    I reviewed a noncontrast head CT which appears unremarkable.    I reviewed CT angiogram of head and neck vessels which shows no high-grade stenosis or occlusion of the basilar artery or other cervical or intracranial vessels.    She is pending brain MRI.    She is unable to provide history and can provide only limited review of systems at present.  She opens eyes but appears very bradyphrenic.    Past Medical History  History reviewed. No pertinent past medical history.  Surgical History  History reviewed. No pertinent surgical history.  Social History  Social History     Substance Use Topics    Drug use: Yes     Types: Benzodiazepines, Marijuana     Comment: tox screen shows pos for both     Allergies  Patient  "has no known allergies.  (Not in a hospital admission)      Review of Systems    Review of Systems:  Neurologic:  As per the history of present illness.  Denies headache.  Constitutional:  Negative for fevers.  : Positive for bladder incontinence.  Dermatologic:  Negative for rash.        Neurological Exam  Physical Exam      General: Lying on left side in ED bed, initially not responsive to examiner, subsequently opens eyes and makes eye contact to repeated loud voice.    Mental Status: Awake but drugged-appearing sensorium with intermittent eye contact but slowness of movement and (very limited) verbal responses.  Very limited command following.  Verbal responses are quiet and delayed by several seconds.  Able to give her last name, the year, gives the month as August, gives the [facility] as [place].  No agitation or aggression.    Cranial Nerves:  Pupils were equal, round and reactive to light from roughly 5 mm to 3 mm bilaterally.  Extraocular movements were intact and conjugate without nystagmus.  No ptosis.  Unable to cooperate with visual field testing.  Facial motor function was symmetrically intact.  Hearing was grossly intact.  No dysarthria.   Tongue protrusion was midline.    Motor: Muscle tone was normal throughout.  Very little cooperation with formal strength testing but at least 3/5 throughout.  No evident lateralized weakness.    Coordination: No postural or rest tremor, myoclonus or dystonic posturing.    Tendon Reflexes: Symmetrically 2-3+ biceps brachioradialis, 3+ patellar, neutral plantars.      Last Recorded Vitals  Blood pressure 109/83, pulse 84, temperature 36.7 °C (98 °F), temperature source Temporal, resp. rate 21, height 1.575 m (5' 2\"), weight 72.6 kg (160 lb), SpO2 99 %.    Relevant Results        NIH Stroke Scale  1A. Level of Consciousness: Requires Repeated Stimulation to Arouse or Responds to Pain  1B. Ask Month and Age: 1 Question Right  1C. Blink Eyes & Squeeze Hands: Performs " Both Tasks  2. Best Gaze: Normal  3. Visual: No Visual Loss  4. Facial Palsy: Normal Symmetrical Movements  5A. Motor - Left Arm: No Drift  5B. Motor - Right Arm: No Drift  6A. Motor - Left Leg: No Drift  6B. Motor - Right Leg: No Drift  7. Limb Ataxia: Present in Two Limbs  8. Sensory Loss: Normal  9. Best Language: Mild-to-Moderate Aphasia  10. Dysarthria: Mild-to-Moderate Dysarthria  11. Extinction and Inattention: Visual, Tactile, Auditory, Spatial, or Personal Inattention  NIH Stroke Scale: 8           Ed Coma Scale  Best Eye Response: To pain  Best Verbal Response: Confused  Best Motor Response: Follows commands  Ed Coma Scale Score: 12                 I have personally reviewed the following imaging results CT angio brain attack head w IV contrast and post procedure    Result Date: 12/5/2023  Interpreted By:  Oskar Oakes, STUDY: CT ANGIO BRAIN ATTACK NECK W IV CONTRAST AND POST PROCEDURE; CT ANGIO BRAIN ATTACK HEAD W IV CONTRAST AND POST PROCEDURE;  12/5/2023 12:43 am   INDICATION: Signs/Symptoms:stroke alert, unable to speak or stand.   COMPARISON: None.   ACCESSION NUMBER(S): [id number]; [id number]   ORDERING CLINICIAN: DONNA MARTIN   TECHNIQUE: After 75 mL of Omnipaque 350 was administered intravenously and axial images of the head and neck were acquired.  Coronal, sagittal, and 3-D reconstructions were provided for review.   FINDINGS: Evaluation of the neck is somewhat degraded by beam hardening artifact from the contrast bolus in the left jugular vein and in the venous plexus surrounding of the cervical spine.   CTA HEAD FINDINGS:   Intracranial carotid arteries demonstrate symmetric opacification bilaterally, without evidence of focal vessel occlusion or stenosis.   The anterior cerebral arteries are symmetric in appearance bilaterally, without evidence of focal vessel occlusion.   Middle cerebral arteries are symmetric in appearance, without evidence of high-grade  stenosis in the proximal M1 segments of the stenosis in the more distal M2 and M3 branches.   Intracranial vertebral arteries demonstrate symmetric opacification bilaterally, without evidence of focal occlusion. The basilar artery somewhat diminutive in appearance, likely due to code dominant supply of the posterior cerebral arteries by the posterior communicating arteries bilaterally. No focal occlusion is identified in the posterior cerebral arteries bilaterally.   No enhancing masses or vascular malformations are identified.   CTA NECK FINDINGS:   Within limitations of the study described above, a three-vessel aortic arch is present. No significant atherosclerotic changes are noted in the aortic arch or origin of the great vessels.   Visualized common carotid arteries demonstrate symmetric opacification bilaterally without evidence of stenosis or atherosclerotic changes.   Extracranial vertebral arteries are not well seen proximally due to beam hardening artifact from the contrast bolus in the left jugular vein in the cervical venous plexus. More distal segments of the extracranial vertebral arteries demonstrate symmetric opacification without evidence of occlusion.   Prevertebral and paraspinal soft tissues do not demonstrate any acute abnormalities.   Included lung apices are clear.   Visualized cervical spine is unremarkable in appearance.       1.  No evidence of large vessel occlusion is identified in the proximal intracranial circulation. 2. Within limitation of the study is somewhat degraded by beam hardening artifact from the contrast bolus in the left jugular vein in the cervical venous plexus, no significant stenosis or large vessel occlusion is identified in the large arteries of the neck.   MACRO: None   Signed by: Oskar Oakes 12/5/2023 1:09 AM Dictation workstation:   [id number]    CT angio brain attack neck w IV contrast and post procedure    Result Date: 12/5/2023  Interpreted By:   Oskar Oakes, STUDY: CT ANGIO BRAIN ATTACK NECK W IV CONTRAST AND POST PROCEDURE; CT ANGIO BRAIN ATTACK HEAD W IV CONTRAST AND POST PROCEDURE;  12/5/2023 12:43 am   INDICATION: Signs/Symptoms:stroke alert, unable to speak or stand.   COMPARISON: None.   ACCESSION NUMBER(S): [id number]; [id number]   ORDERING CLINICIAN: DONNA MARTIN   TECHNIQUE: After 75 mL of Omnipaque 350 was administered intravenously and axial images of the head and neck were acquired.  Coronal, sagittal, and 3-D reconstructions were provided for review.   FINDINGS: Evaluation of the neck is somewhat degraded by beam hardening artifact from the contrast bolus in the left jugular vein and in the venous plexus surrounding of the cervical spine.   CTA HEAD FINDINGS:   Intracranial carotid arteries demonstrate symmetric opacification bilaterally, without evidence of focal vessel occlusion or stenosis.   The anterior cerebral arteries are symmetric in appearance bilaterally, without evidence of focal vessel occlusion.   Middle cerebral arteries are symmetric in appearance, without evidence of high-grade stenosis in the proximal M1 segments of the stenosis in the more distal M2 and M3 branches.   Intracranial vertebral arteries demonstrate symmetric opacification bilaterally, without evidence of focal occlusion. The basilar artery somewhat diminutive in appearance, likely due to code dominant supply of the posterior cerebral arteries by the posterior communicating arteries bilaterally. No focal occlusion is identified in the posterior cerebral arteries bilaterally.   No enhancing masses or vascular malformations are identified.   CTA NECK FINDINGS:   Within limitations of the study described above, a three-vessel aortic arch is present. No significant atherosclerotic changes are noted in the aortic arch or origin of the great vessels.   Visualized common carotid arteries demonstrate symmetric opacification bilaterally without  evidence of stenosis or atherosclerotic changes.   Extracranial vertebral arteries are not well seen proximally due to beam hardening artifact from the contrast bolus in the left jugular vein in the cervical venous plexus. More distal segments of the extracranial vertebral arteries demonstrate symmetric opacification without evidence of occlusion.   Prevertebral and paraspinal soft tissues do not demonstrate any acute abnormalities.   Included lung apices are clear.   Visualized cervical spine is unremarkable in appearance.       1.  No evidence of large vessel occlusion is identified in the proximal intracranial circulation. 2. Within limitation of the study is somewhat degraded by beam hardening artifact from the contrast bolus in the left jugular vein in the cervical venous plexus, no significant stenosis or large vessel occlusion is identified in the large arteries of the neck.   MACRO: None   Signed by: Oskar Oakes 12/5/2023 1:09 AM Dictation workstation:   [id number]    CT brain attack head wo IV contrast    Result Date: 12/5/2023  Interpreted By:  Oskar Oakes, STUDY: CT BRAIN ATTACK HEAD WO IV CONTRAST;  12/5/2023 12:32 am   INDICATION: Signs/Symptoms:weakness, stroke alert.   COMPARISON: None.   ACCESSION NUMBER(S): [id number]   ORDERING CLINICIAN: DONNA MARTIN   TECHNIQUE: Noncontrast axial CT scan of head was performed. Angled reformats in brain and bone windows were generated. The images were reviewed in bone, brain, blood and soft tissue windows.   FINDINGS: No hyperdense intracranial hemorrhage is evident. There is no mass effect or midline shift.   Gray-white differentiation is intact, without evidence of CT apparent transcortical infarct.   No ventricular dilatation is present. Basal cisterns are patent. No extra-axial fluid collections are identified.   Scalp soft tissues do not demonstrate any acute abnormalities. Calvarium is unremarkable in appearance. Mastoid  air cells and middle ear cavities are well aerated.   Visualized paranasal sinuses are unremarkable in appearance.       No evidence of hemorrhage, CT apparent transcortical infarct, or other acute intracranial abnormality.   MACRO: Oskar Oakes discussed the significance and urgency of this critical finding by Epic chat with  DONNA MARTIN on 12/5/2023 at 12:42 am.  (**-RCF-**) Findings:  See findings.   Signed by: Oskar Oakes 12/5/2023 12:49 AM Dictation workstation:   [id number]  .      Assessment/Plan     This young woman presents with encephalopathy that appears most likely to be related to illicit substances.    Head CT is unremarkable but I agree with proceeding to a brain MRI given her current mental state and limited information available.    Also recommend EEG.    Additionally recommend thiamine 250 mg intravenous now and daily x 3 doses.    Following.         Eloy Levi MD

## 2023-12-05 NOTE — H&P
History Of Present Illness  Mariann Dave is a 28 y.o. female presented with c/o AMS. HPI limited as patient with AMS and unable to provide meaningful HPI.      Past Medical History  History reviewed. No pertinent past medical history.    Surgical History  History reviewed. No pertinent surgical history.    Social History  Social History     Socioeconomic History    Marital status: Single     Spouse name: None    Number of children: None    Years of education: None    Highest education level: None   Occupational History    None   Tobacco Use    Smoking status: None    Smokeless tobacco: None   Substance and Sexual Activity    Alcohol use: None    Drug use: None    Sexual activity: None   Other Topics Concern    None   Social History Narrative    None     Social Determinants of Health     Financial Resource Strain: Not on file   Food Insecurity: Not on file   Transportation Needs: Not on file   Physical Activity: Not on file   Stress: Not on file   Social Connections: Not on file   Intimate Partner Violence: Not on file   Housing Stability: Not on file        Family History  No family history on file.     Allergies  Allergies as of 12/04/2023    (Not on File)        Review of Systems  Review of Systems   Unable to perform ROS: Mental status change   Psychiatric/Behavioral:  Positive for confusion.        Relevant results reviewed   PROVIDER notes  Nursing notes  MEDS:  Current Facility-Administered Medications   Medication Dose Route Frequency Provider Last Rate Last Admin    acetaminophen (Tylenol) tablet 975 mg  975 mg oral q6h PRN Guerda Latif PA-C        docusate sodium (Colace) capsule 100 mg  100 mg oral BID PRN Guerda Latif PA-C        melatonin tablet 3 mg  3 mg oral Daily Guerda Latif PA-C        pantoprazole (ProtoNix) EC tablet 40 mg  40 mg oral Daily before breakfast Guerda Latif PA-C        Or    pantoprazole (ProtoNix) injection 40 mg  40 mg intravenous Daily before breakfast Guerda CHANDLER  DAVID Latif         No current outpatient medications on file.      LABS:  Results for orders placed or performed during the hospital encounter of 12/05/23 (from the past 24 hour(s))   POCT GLUCOSE   Result Value Ref Range    POCT Glucose 85 74 - 99 mg/dL   CBC and Auto Differential   Result Value Ref Range    WBC 5.9 4.4 - 11.3 x10*3/uL    nRBC 0.0 0.0 - 0.0 /100 WBCs    RBC 4.07 4.00 - 5.20 x10*6/uL    Hemoglobin 10.9 (L) 12.0 - 16.0 g/dL    Hematocrit 35.2 (L) 36.0 - 46.0 %    MCV 87 80 - 100 fL    MCH 26.8 26.0 - 34.0 pg    MCHC 31.0 (L) 32.0 - 36.0 g/dL    RDW 14.4 11.5 - 14.5 %    Platelets 298 150 - 450 x10*3/uL    Neutrophils % 60.1 40.0 - 80.0 %    Immature Granulocytes %, Automated 0.3 0.0 - 0.9 %    Lymphocytes % 30.7 13.0 - 44.0 %    Monocytes % 7.9 2.0 - 10.0 %    Eosinophils % 0.5 0.0 - 6.0 %    Basophils % 0.5 0.0 - 2.0 %    Neutrophils Absolute 3.55 1.20 - 7.70 x10*3/uL    Immature Granulocytes Absolute, Automated 0.02 0.00 - 0.70 x10*3/uL    Lymphocytes Absolute 1.82 1.20 - 4.80 x10*3/uL    Monocytes Absolute 0.47 0.10 - 1.00 x10*3/uL    Eosinophils Absolute 0.03 0.00 - 0.70 x10*3/uL    Basophils Absolute 0.03 0.00 - 0.10 x10*3/uL   Comprehensive metabolic panel   Result Value Ref Range    Glucose 85 74 - 99 mg/dL    Sodium 132 (L) 136 - 145 mmol/L    Potassium 3.5 3.5 - 5.3 mmol/L    Chloride 99 98 - 107 mmol/L    Bicarbonate 30 21 - 32 mmol/L    Anion Gap 7 (L) 10 - 20 mmol/L    Urea Nitrogen 9 6 - 23 mg/dL    Creatinine 0.68 0.50 - 1.05 mg/dL    eGFR >90 >60 mL/min/1.73m*2    Calcium 8.3 (L) 8.6 - 10.3 mg/dL    Albumin 3.4 3.4 - 5.0 g/dL    Alkaline Phosphatase 47 33 - 110 U/L    Total Protein 5.8 (L) 6.4 - 8.2 g/dL    AST 14 9 - 39 U/L    Bilirubin, Total 0.5 0.0 - 1.2 mg/dL    ALT 6 (L) 7 - 45 U/L   Magnesium   Result Value Ref Range    Magnesium 1.60 1.60 - 2.40 mg/dL   Blood Gas Venous Full Panel   Result Value Ref Range    POCT pH, Venous 7.38 7.33 - 7.43 pH    POCT pCO2, Venous 46 41 - 51  mm Hg    POCT pO2, Venous 52 (H) 35 - 45 mm Hg    POCT SO2, Venous 84 (H) 45 - 75 %    POCT Oxy Hemoglobin, Venous 82.3 (H) 45.0 - 75.0 %    POCT Hematocrit Calculated, Venous 35.0 (L) 36.0 - 46.0 %    POCT Sodium, Venous 136 136 - 145 mmol/L    POCT Potassium, Venous 3.5 3.5 - 5.3 mmol/L    POCT Chloride, Venous 104 98 - 107 mmol/L    POCT Ionized Calicum, Venous 1.17 1.10 - 1.33 mmol/L    POCT Glucose, Venous 80 74 - 99 mg/dL    POCT Lactate, Venous 0.7 0.4 - 2.0 mmol/L    POCT Base Excess, Venous 1.6 -2.0 - 3.0 mmol/L    POCT HCO3 Calculated, Venous 27.2 (H) 22.0 - 26.0 mmol/L    POCT Hemoglobin, Venous 11.5 (L) 12.0 - 16.0 g/dL    POCT Anion Gap, Venous 8.0 (L) 10.0 - 25.0 mmol/L    Patient Temperature 37.0 degrees Celsius    FiO2 21 %   Lactate   Result Value Ref Range    Lactate 0.6 0.4 - 2.0 mmol/L   Human Chorionic Gonadotropin, Serum Quantitative   Result Value Ref Range    HCG, Beta-Quantitative <2 <5 mIU/mL   Salicylate level   Result Value Ref Range    Salicylate  <3 4 - 20 mg/dL   Acetaminophen level   Result Value Ref Range    Acetaminophen <10.0 10.0 - 30.0 ug/mL   Light Blue Top   Result Value Ref Range    Extra Tube Hold for add-ons.    SARS-CoV-2 RT PCR   Result Value Ref Range    Coronavirus 2019, PCR Not Detected Not Detected   Urinalysis with Reflex Microscopic   Result Value Ref Range    Color, Urine Yellow Straw, Yellow    Appearance, Urine Clear Clear    Specific Gravity, Urine <1.005 (A) 1.005 - 1.035    pH, Urine 6.5 5.0, 5.5, 6.0, 6.5, 7.0, 7.5, 8.0    Protein, Urine 30 (1+) (A) NEGATIVE, 10 (TRACE) mg/dL    Glucose, Urine NEGATIVE NEGATIVE mg/dL    Blood, Urine NEGATIVE NEGATIVE    Ketones, Urine NEGATIVE NEGATIVE mg/dL    Bilirubin, Urine NEGATIVE NEGATIVE    Urobilinogen, Urine NORM NORM mg/dL    Nitrite, Urine NEGATIVE NEGATIVE    Leukocyte Esterase, Urine NEGATIVE NEGATIVE   Drug Screen, Urine   Result Value Ref Range    Amphetamine Screen, Urine Presumptive Negative Presumptive  Negative    Barbiturate Screen, Urine Presumptive Negative Presumptive Negative    Benzodiazepines Screen, Urine Presumptive Positive (A) Presumptive Negative    Cannabinoid Screen, Urine Presumptive Positive (A) Presumptive Negative    Cocaine Metabolite Screen, Urine Presumptive Negative Presumptive Negative    Fentanyl Screen, Urine Presumptive Negative Presumptive Negative    Opiate Screen, Urine Presumptive Negative Presumptive Negative    Oxycodone Screen, Urine Presumptive Negative Presumptive Negative    PCP Screen, Urine Presumptive Negative Presumptive Negative   Microscopic Only, Urine   Result Value Ref Range    WBC, Urine NONE 1-5, NONE /HPF    RBC, Urine 1-2 NONE, 1-2, 3-5 /HPF    Squamous Epithelial Cells, Urine 1-9 (SPARSE) Reference range not established. /HPF      IMAGING:  CT angio brain attack head w IV contrast and post procedure   Final Result   1.  No evidence of large vessel occlusion is identified in the   proximal intracranial circulation.   2. Within limitation of the study is somewhat degraded by beam   hardening artifact from the contrast bolus in the left jugular vein   in the cervical venous plexus, no significant stenosis or large   vessel occlusion is identified in the large arteries of the neck.        MACRO:   None        Signed by: Oskar Palaciosdenice 12/5/2023 1:09 AM   Dictation workstation:   [id number]      CT angio brain attack neck w IV contrast and post procedure   Final Result   1.  No evidence of large vessel occlusion is identified in the   proximal intracranial circulation.   2. Within limitation of the study is somewhat degraded by beam   hardening artifact from the contrast bolus in the left jugular vein   in the cervical venous plexus, no significant stenosis or large   vessel occlusion is identified in the large arteries of the neck.        MACRO:   None        Signed by: Oskar Oakes 12/5/2023 1:09 AM   Dictation workstation:   [id number]      CT brain  "attack head wo IV contrast   Final Result   No evidence of hemorrhage, CT apparent transcortical infarct, or   other acute intracranial abnormality.        MACRO:   Oskar Solisdionnegeorgette discussed the significance and urgency of this   critical finding by Epic chat with  DONNA MARTIN on   12/5/2023 at 12:42 am.  (**-RCF-**) Findings:  See findings.        Signed by: Oskar Oakes 12/5/2023 12:49 AM   Dictation workstation:   [id number]             PHYSICAL EXAM  BP (!) 123/103   Pulse 97   Temp 36.7 °C (98 °F) (Temporal)   Resp 18   Ht 1.575 m (5' 2\")   Wt 72.6 kg (160 lb)   SpO2 97%   BMI 29.26 kg/m²   PHYSICAL EXAM:  GENERAL: Resting, arouses to voice/light touch  SKIN: Warm and dry.  No suspicious lesions or rashes.  HEENT:  NCAT, PERRLA, EOMI, nonicteric sclera, MMM, neck supple, trachea midline  LUNGS: Unlabored, no significant wheezing, rhonchi, or rales appreciated  CARDS: RRR  GI: Soft, NTND, BS+, no rebound, no guarding   MS/Extremities: WWP, no edema, distal pulses intact, moves all extremities  NEURO: A&Ox3, she will say her name but otherwise not answering questions, no focal deficits appreciated     Assessment/Plan:   Principal Problem:    AMS (altered mental status)  -labs essentially unremarkable  -utox positive marijuana and benzos  -CTH and CTA negative for acute processes  -MRI brain pending  -suspect AMS due to benzos and do not feel additional TIA/stroke work-up indicated at this time.  -consider neurology and/or psych consult   -GI ppx: Protonix  -VTE ppx: Ambulation        Total time spent [including but not limited to]: Obtaining and reviewing patient medical records/history, obtaining a separate history,  examining and assessing the patient, providing  and education, placing pertinent orders for labs/tests/medications,  communicating with the patient/family/health team, documenting in the patient's EMR/formulation of this note, independently interpreting " results and data, coordinating care:   45 minutes, with greater than 50% spent in personal discussion with patient and/or family    Guerda Latif PA-C

## 2023-12-05 NOTE — CARE PLAN
The patient's goals for the shift include      The clinical goals for the shift include pt regains full cognitive function and mobility    Problem: Pain - Adult  Goal: Verbalizes/displays adequate comfort level or baseline comfort level  Outcome: Progressing     Problem: Safety - Adult  Goal: Free from fall injury  Outcome: Progressing     Problem: Discharge Planning  Goal: Discharge to home or other facility with appropriate resources  Outcome: Progressing     Problem: Chronic Conditions and Co-morbidities  Goal: Patient's chronic conditions and co-morbidity symptoms are monitored and maintained or improved  Outcome: Progressing     Problem: Fall/Injury  Goal: Not fall by end of shift  Outcome: Progressing  Goal: Be free from injury by end of the shift  Outcome: Progressing  Goal: Verbalize understanding of personal risk factors for fall in the hospital  Outcome: Progressing  Goal: Verbalize understanding of risk factor reduction measures to prevent injury from fall in the home  Outcome: Progressing  Goal: Use assistive devices by end of the shift  Outcome: Progressing  Goal: Pace activities to prevent fatigue by end of the shift  Outcome: Progressing

## 2023-12-06 VITALS
TEMPERATURE: 98.2 F | BODY MASS INDEX: 28.89 KG/M2 | HEART RATE: 76 BPM | SYSTOLIC BLOOD PRESSURE: 113 MMHG | HEIGHT: 62 IN | OXYGEN SATURATION: 100 % | DIASTOLIC BLOOD PRESSURE: 77 MMHG | RESPIRATION RATE: 18 BRPM | WEIGHT: 157 LBS

## 2023-12-06 LAB
AMMONIA PLAS-SCNC: 42 UMOL/L (ref 16–53)
ANION GAP SERPL CALC-SCNC: 12 MMOL/L (ref 10–20)
APPEARANCE UR: ABNORMAL
BACTERIA #/AREA URNS AUTO: ABNORMAL /HPF
BILIRUB UR STRIP.AUTO-MCNC: NEGATIVE MG/DL
BUN SERPL-MCNC: 11 MG/DL (ref 6–23)
CALCIUM SERPL-MCNC: 8.6 MG/DL (ref 8.6–10.3)
CAOX CRY #/AREA UR COMP ASSIST: ABNORMAL /HPF
CHLORIDE SERPL-SCNC: 105 MMOL/L (ref 98–107)
CO2 SERPL-SCNC: 26 MMOL/L (ref 21–32)
COLOR UR: YELLOW
CREAT SERPL-MCNC: 0.69 MG/DL (ref 0.5–1.05)
ERYTHROCYTE [DISTWIDTH] IN BLOOD BY AUTOMATED COUNT: 14.6 % (ref 11.5–14.5)
GFR SERPL CREATININE-BSD FRML MDRD: >90 ML/MIN/1.73M*2
GLUCOSE SERPL-MCNC: 82 MG/DL (ref 74–99)
GLUCOSE UR STRIP.AUTO-MCNC: NEGATIVE MG/DL
HCT VFR BLD AUTO: 38.4 % (ref 36–46)
HGB BLD-MCNC: 11.5 G/DL (ref 12–16)
KETONES UR STRIP.AUTO-MCNC: NEGATIVE MG/DL
LEUKOCYTE ESTERASE UR QL STRIP.AUTO: ABNORMAL
MCH RBC QN AUTO: 26.8 PG (ref 26–34)
MCHC RBC AUTO-ENTMCNC: 29.9 G/DL (ref 32–36)
MCV RBC AUTO: 90 FL (ref 80–100)
MUCOUS THREADS #/AREA URNS AUTO: ABNORMAL /LPF
NITRITE UR QL STRIP.AUTO: NEGATIVE
NRBC BLD-RTO: 0 /100 WBCS (ref 0–0)
PH UR STRIP.AUTO: 5 [PH]
PLATELET # BLD AUTO: 283 X10*3/UL (ref 150–450)
POTASSIUM SERPL-SCNC: 3.5 MMOL/L (ref 3.5–5.3)
PROT UR STRIP.AUTO-MCNC: ABNORMAL MG/DL
RBC # BLD AUTO: 4.29 X10*6/UL (ref 4–5.2)
RBC # UR STRIP.AUTO: NEGATIVE /UL
RBC #/AREA URNS AUTO: ABNORMAL /HPF
SODIUM SERPL-SCNC: 139 MMOL/L (ref 136–145)
SP GR UR STRIP.AUTO: 1.03
SQUAMOUS #/AREA URNS AUTO: ABNORMAL /HPF
UROBILINOGEN UR STRIP.AUTO-MCNC: 2 MG/DL
WBC # BLD AUTO: 6.4 X10*3/UL (ref 4.4–11.3)
WBC #/AREA URNS AUTO: ABNORMAL /HPF

## 2023-12-06 PROCEDURE — 87086 URINE CULTURE/COLONY COUNT: CPT | Mod: AHULAB | Performed by: PHYSICIAN ASSISTANT

## 2023-12-06 PROCEDURE — G0378 HOSPITAL OBSERVATION PER HR: HCPCS

## 2023-12-06 PROCEDURE — 36415 COLL VENOUS BLD VENIPUNCTURE: CPT | Performed by: NURSE PRACTITIONER

## 2023-12-06 PROCEDURE — 81003 URINALYSIS AUTO W/O SCOPE: CPT | Performed by: PHYSICIAN ASSISTANT

## 2023-12-06 PROCEDURE — 99231 SBSQ HOSP IP/OBS SF/LOW 25: CPT | Performed by: PHYSICIAN ASSISTANT

## 2023-12-06 PROCEDURE — 36415 COLL VENOUS BLD VENIPUNCTURE: CPT | Performed by: PHYSICIAN ASSISTANT

## 2023-12-06 PROCEDURE — 85027 COMPLETE CBC AUTOMATED: CPT | Performed by: NURSE PRACTITIONER

## 2023-12-06 PROCEDURE — 2500000004 HC RX 250 GENERAL PHARMACY W/ HCPCS (ALT 636 FOR OP/ED): Performed by: PSYCHIATRY & NEUROLOGY

## 2023-12-06 PROCEDURE — 99231 SBSQ HOSP IP/OBS SF/LOW 25: CPT | Performed by: PSYCHIATRY & NEUROLOGY

## 2023-12-06 PROCEDURE — 80048 BASIC METABOLIC PNL TOTAL CA: CPT | Performed by: NURSE PRACTITIONER

## 2023-12-06 PROCEDURE — 82140 ASSAY OF AMMONIA: CPT | Performed by: PHYSICIAN ASSISTANT

## 2023-12-06 PROCEDURE — 96365 THER/PROPH/DIAG IV INF INIT: CPT | Performed by: INTERNAL MEDICINE

## 2023-12-06 RX ADMIN — THIAMINE HYDROCHLORIDE 250 MG: 100 INJECTION, SOLUTION INTRAMUSCULAR; INTRAVENOUS at 10:21

## 2023-12-06 ASSESSMENT — COGNITIVE AND FUNCTIONAL STATUS - GENERAL
MOVING FROM LYING ON BACK TO SITTING ON SIDE OF FLAT BED WITH BEDRAILS: A LITTLE
DRESSING REGULAR LOWER BODY CLOTHING: A LITTLE
WALKING IN HOSPITAL ROOM: A LOT
HELP NEEDED FOR BATHING: A LITTLE
CLIMB 3 TO 5 STEPS WITH RAILING: A LOT
TURNING FROM BACK TO SIDE WHILE IN FLAT BAD: A LITTLE
PERSONAL GROOMING: A LITTLE
STANDING UP FROM CHAIR USING ARMS: A LOT
MOVING TO AND FROM BED TO CHAIR: A LOT
DRESSING REGULAR UPPER BODY CLOTHING: A LITTLE
TOILETING: A LITTLE

## 2023-12-06 ASSESSMENT — PAIN - FUNCTIONAL ASSESSMENT: PAIN_FUNCTIONAL_ASSESSMENT: 0-10

## 2023-12-06 ASSESSMENT — PAIN SCALES - GENERAL: PAINLEVEL_OUTOF10: 0 - NO PAIN

## 2023-12-06 NOTE — CARE PLAN
SW and PA  discussed how pt had mentioned  taking medication intranasally and how  sx appear  to be substance induced  but pt  is  declining any other  info.  [facility] hx of [facility] use with pt.     Barbara Jimenez, MSW, LSW

## 2023-12-06 NOTE — PROGRESS NOTES
Discussed patient during IDR- patient waiting on EEG and results.  Neuro following.  Anticipate discharge home after EEG and neuro clearance.  Will continue to follow for discharge planning needs.

## 2023-12-06 NOTE — PROGRESS NOTES
Mariann Dave is a 28 y.o. female on day 0 of admission presenting with AMS (altered mental status).    Subjective   Patient sleeping, NAD. She wakes up and is oriented to person and place. She does thing it is August. She denies headache, fever, chills, n/v, palpitations. Today, she denies using illicit substances. She continues to exhibit delayed mentation. Continues with imbalance.        Objective     Physical Exam  Constitutional:       General: She is not in acute distress.     Appearance: She is not toxic-appearing.   HENT:      Head: Normocephalic and atraumatic.      Right Ear: External ear normal.      Left Ear: External ear normal.      Nose: Nose normal. No congestion.      Mouth/Throat:      Mouth: Mucous membranes are moist.      Pharynx: Oropharynx is clear.   Eyes:      General:         Right eye: No discharge.         Left eye: No discharge.      Conjunctiva/sclera: Conjunctivae normal.      Pupils: Pupils are equal, round, and reactive to light.   Cardiovascular:      Rate and Rhythm: Normal rate and regular rhythm.      Heart sounds: No murmur heard.     No gallop.   Pulmonary:      Effort: Pulmonary effort is normal.      Breath sounds: No wheezing or rales.   Abdominal:      General: Abdomen is flat. Bowel sounds are normal.      Palpations: Abdomen is soft.      Tenderness: There is no abdominal tenderness. There is no guarding or rebound.   Musculoskeletal:         General: No swelling or tenderness. Normal range of motion.      Right lower leg: No edema.      Left lower leg: No edema.   Skin:     General: Skin is warm and dry.      Findings: No erythema or rash.   Neurological:      General: No focal deficit present.      Mental Status: She is alert.      Cranial Nerves: No cranial nerve deficit.      Sensory: No sensory deficit.      Motor: Weakness present.      Coordination: Coordination abnormal.      Comments: Patient is alert and oriented to person, place and time. However, she has  "limited recall of events leading up to her hospitalization. Unsure how she got to ED or who took her here. Unsure why she is in ED. Mentation delayed. Slurred speech noted. Head turn and shoulder shrug intact but delayed. PERRL, no nystagmus noted, difficulty with convergence. Weakness noted bilaterally upper and lower extremities, does not lateralize.    Psychiatric:         Mood and Affect: Mood normal.         Thought Content: Thought content normal.         Last Recorded Vitals  Blood pressure 122/88, pulse 86, temperature 36.9 °C (98.4 °F), temperature source Temporal, resp. rate 18, height 1.575 m (5' 2\"), weight 71.2 kg (157 lb), SpO2 100 %.  Intake/Output last 3 Shifts:  I/O last 3 completed shifts:  In: 360 (5.1 mL/kg) [P.O.:360]  Out: - (0 mL/kg)   Weight: 71.2 kg     Relevant Results              Scheduled medications  melatonin, 3 mg, oral, Daily  pantoprazole, 40 mg, oral, Daily before breakfast   Or  pantoprazole, 40 mg, intravenous, Daily before breakfast  thiamine, 250 mg, intravenous, Daily      Continuous medications     PRN medications  PRN medications: acetaminophen, docusate sodium  Results for orders placed or performed during the hospital encounter of 12/05/23 (from the past 24 hour(s))   CBC   Result Value Ref Range    WBC 5.5 4.4 - 11.3 x10*3/uL    nRBC 0.0 0.0 - 0.0 /100 WBCs    RBC 4.36 4.00 - 5.20 x10*6/uL    Hemoglobin 11.6 (L) 12.0 - 16.0 g/dL    Hematocrit 37.9 36.0 - 46.0 %    MCV 87 80 - 100 fL    MCH 26.6 26.0 - 34.0 pg    MCHC 30.6 (L) 32.0 - 36.0 g/dL    RDW 14.5 11.5 - 14.5 %    Platelets 290 150 - 450 x10*3/uL   Comprehensive metabolic panel   Result Value Ref Range    Glucose 84 74 - 99 mg/dL    Sodium 138 136 - 145 mmol/L    Potassium 3.5 3.5 - 5.3 mmol/L    Chloride 104 98 - 107 mmol/L    Bicarbonate 25 21 - 32 mmol/L    Anion Gap 13 10 - 20 mmol/L    Urea Nitrogen 9 6 - 23 mg/dL    Creatinine 0.71 0.50 - 1.05 mg/dL    eGFR >90 >60 mL/min/1.73m*2    Calcium 8.5 (L) 8.6 - 10.3 " mg/dL    Albumin 3.6 3.4 - 5.0 g/dL    Alkaline Phosphatase 53 33 - 110 U/L    Total Protein 6.2 (L) 6.4 - 8.2 g/dL    AST 17 9 - 39 U/L    Bilirubin, Total 0.8 0.0 - 1.2 mg/dL    ALT 7 7 - 45 U/L   Magnesium   Result Value Ref Range    Magnesium 1.60 1.60 - 2.40 mg/dL   CBC   Result Value Ref Range    WBC 6.4 4.4 - 11.3 x10*3/uL    nRBC 0.0 0.0 - 0.0 /100 WBCs    RBC 4.29 4.00 - 5.20 x10*6/uL    Hemoglobin 11.5 (L) 12.0 - 16.0 g/dL    Hematocrit 38.4 36.0 - 46.0 %    MCV 90 80 - 100 fL    MCH 26.8 26.0 - 34.0 pg    MCHC 29.9 (L) 32.0 - 36.0 g/dL    RDW 14.6 (H) 11.5 - 14.5 %    Platelets 283 150 - 450 x10*3/uL   Basic metabolic panel   Result Value Ref Range    Glucose 82 74 - 99 mg/dL    Sodium 139 136 - 145 mmol/L    Potassium 3.5 3.5 - 5.3 mmol/L    Chloride 105 98 - 107 mmol/L    Bicarbonate 26 21 - 32 mmol/L    Anion Gap 12 10 - 20 mmol/L    Urea Nitrogen 11 6 - 23 mg/dL    Creatinine 0.69 0.50 - 1.05 mg/dL    eGFR >90 >60 mL/min/1.73m*2    Calcium 8.6 8.6 - 10.3 mg/dL   Ammonia   Result Value Ref Range    Ammonia 42 16 - 53 umol/L     CT angio brain attack head w IV contrast and post procedure    Result Date: 12/5/2023  Interpreted By:  Oskar Oakes, STUDY: CT ANGIO BRAIN ATTACK NECK W IV CONTRAST AND POST PROCEDURE; CT ANGIO BRAIN ATTACK HEAD W IV CONTRAST AND POST PROCEDURE;  12/5/2023 12:43 am   INDICATION: Signs/Symptoms:stroke alert, unable to speak or stand.   COMPARISON: None.   ACCESSION NUMBER(S): [id number]; [id number]   ORDERING CLINICIAN: DONNA MARTIN   TECHNIQUE: After 75 mL of Omnipaque 350 was administered intravenously and axial images of the head and neck were acquired.  Coronal, sagittal, and 3-D reconstructions were provided for review.   FINDINGS: Evaluation of the neck is somewhat degraded by beam hardening artifact from the contrast bolus in the left jugular vein and in the venous plexus surrounding of the cervical spine.   CTA HEAD FINDINGS:   Intracranial  carotid arteries demonstrate symmetric opacification bilaterally, without evidence of focal vessel occlusion or stenosis.   The anterior cerebral arteries are symmetric in appearance bilaterally, without evidence of focal vessel occlusion.   Middle cerebral arteries are symmetric in appearance, without evidence of high-grade stenosis in the proximal M1 segments of the stenosis in the more distal M2 and M3 branches.   Intracranial vertebral arteries demonstrate symmetric opacification bilaterally, without evidence of focal occlusion. The basilar artery somewhat diminutive in appearance, likely due to code dominant supply of the posterior cerebral arteries by the posterior communicating arteries bilaterally. No focal occlusion is identified in the posterior cerebral arteries bilaterally.   No enhancing masses or vascular malformations are identified.   CTA NECK FINDINGS:   Within limitations of the study described above, a three-vessel aortic arch is present. No significant atherosclerotic changes are noted in the aortic arch or origin of the great vessels.   Visualized common carotid arteries demonstrate symmetric opacification bilaterally without evidence of stenosis or atherosclerotic changes.   Extracranial vertebral arteries are not well seen proximally due to beam hardening artifact from the contrast bolus in the left jugular vein in the cervical venous plexus. More distal segments of the extracranial vertebral arteries demonstrate symmetric opacification without evidence of occlusion.   Prevertebral and paraspinal soft tissues do not demonstrate any acute abnormalities.   Included lung apices are clear.   Visualized cervical spine is unremarkable in appearance.       1.  No evidence of large vessel occlusion is identified in the proximal intracranial circulation. 2. Within limitation of the study is somewhat degraded by beam hardening artifact from the contrast bolus in the left jugular vein in the cervical  venous plexus, no significant stenosis or large vessel occlusion is identified in the large arteries of the neck.   MACRO: None   Signed by: Oskar Oakes 12/5/2023 1:09 AM Dictation workstation:   [id number]    CT angio brain attack neck w IV contrast and post procedure    Result Date: 12/5/2023  Interpreted By:  Oskar Oakes, STUDY: CT ANGIO BRAIN ATTACK NECK W IV CONTRAST AND POST PROCEDURE; CT ANGIO BRAIN ATTACK HEAD W IV CONTRAST AND POST PROCEDURE;  12/5/2023 12:43 am   INDICATION: Signs/Symptoms:stroke alert, unable to speak or stand.   COMPARISON: None.   ACCESSION NUMBER(S): [id number]; [id number]   ORDERING CLINICIAN: ODNNA MARTIN   TECHNIQUE: After 75 mL of Omnipaque 350 was administered intravenously and axial images of the head and neck were acquired.  Coronal, sagittal, and 3-D reconstructions were provided for review.   FINDINGS: Evaluation of the neck is somewhat degraded by beam hardening artifact from the contrast bolus in the left jugular vein and in the venous plexus surrounding of the cervical spine.   CTA HEAD FINDINGS:   Intracranial carotid arteries demonstrate symmetric opacification bilaterally, without evidence of focal vessel occlusion or stenosis.   The anterior cerebral arteries are symmetric in appearance bilaterally, without evidence of focal vessel occlusion.   Middle cerebral arteries are symmetric in appearance, without evidence of high-grade stenosis in the proximal M1 segments of the stenosis in the more distal M2 and M3 branches.   Intracranial vertebral arteries demonstrate symmetric opacification bilaterally, without evidence of focal occlusion. The basilar artery somewhat diminutive in appearance, likely due to code dominant supply of the posterior cerebral arteries by the posterior communicating arteries bilaterally. No focal occlusion is identified in the posterior cerebral arteries bilaterally.   No enhancing masses or vascular  malformations are identified.   CTA NECK FINDINGS:   Within limitations of the study described above, a three-vessel aortic arch is present. No significant atherosclerotic changes are noted in the aortic arch or origin of the great vessels.   Visualized common carotid arteries demonstrate symmetric opacification bilaterally without evidence of stenosis or atherosclerotic changes.   Extracranial vertebral arteries are not well seen proximally due to beam hardening artifact from the contrast bolus in the left jugular vein in the cervical venous plexus. More distal segments of the extracranial vertebral arteries demonstrate symmetric opacification without evidence of occlusion.   Prevertebral and paraspinal soft tissues do not demonstrate any acute abnormalities.   Included lung apices are clear.   Visualized cervical spine is unremarkable in appearance.       1.  No evidence of large vessel occlusion is identified in the proximal intracranial circulation. 2. Within limitation of the study is somewhat degraded by beam hardening artifact from the contrast bolus in the left jugular vein in the cervical venous plexus, no significant stenosis or large vessel occlusion is identified in the large arteries of the neck.   MACRO: None   Signed by: Oskar Oakes 12/5/2023 1:09 AM Dictation workstation:   [id number]    CT brain attack head wo IV contrast    Result Date: 12/5/2023  Interpreted By:  Oskar Oakes, STUDY: CT BRAIN ATTACK HEAD WO IV CONTRAST;  12/5/2023 12:32 am   INDICATION: Signs/Symptoms:weakness, stroke alert.   COMPARISON: None.   ACCESSION NUMBER(S): [id number]   ORDERING CLINICIAN: DONNA MARTIN   TECHNIQUE: Noncontrast axial CT scan of head was performed. Angled reformats in brain and bone windows were generated. The images were reviewed in bone, brain, blood and soft tissue windows.   FINDINGS: No hyperdense intracranial hemorrhage is evident. There is no mass effect or  midline shift.   Gray-white differentiation is intact, without evidence of CT apparent transcortical infarct.   No ventricular dilatation is present. Basal cisterns are patent. No extra-axial fluid collections are identified.   Scalp soft tissues do not demonstrate any acute abnormalities. Calvarium is unremarkable in appearance. Mastoid air cells and middle ear cavities are well aerated.   Visualized paranasal sinuses are unremarkable in appearance.       No evidence of hemorrhage, CT apparent transcortical infarct, or other acute intracranial abnormality.   MACRO: Oskar Oakes discussed the significance and urgency of this critical finding by Epic chat with  DONNA MARTIN on 12/5/2023 at 12:42 am.  (**-RCF-**) Findings:  See findings.   Signed by: Oskar Oakes 12/5/2023 12:49 AM Dictation workstation:   [id number]                  Assessment/Plan   Principal Problem:    AMS (altered mental status)    AMS (altered mental status)  -Delayed mentation, slurred/unclear speech, generalized weakness, impaired balance. However no focal neurological deficits noted. Patient denies falls or trauma. Denies fever chills headache. Does admit to using a drug intranasally, however later denies this.   -labs essentially unremarkable  -utox positive marijuana and benzos  -CTH and CTA negative for acute processes  -MRI brain negative for acute process or abnormality  -neurology consult appreciated     -suspect AMS 2/2 to substance use   -SW consult pending concern for substance use d/o    GI ppx   -Protonix    VTE ppx  -Ambulation      Dischareg:  -pending neurology input, consider psych consult     Discussed results labs imagine with Dr. Hilton.         I spent 40 minutes in the professional and overall care of this patient.      Benedicto Levi PA-C

## 2023-12-06 NOTE — DISCHARGE SUMMARY
Discharge Diagnosis  AMS (altered mental status)    Issues Requiring Follow-Up  Substance use disorder    Discharge Meds     Your medication list      You have not been prescribed any medications.         Test Results Pending At Discharge  Pending Labs       Order Current Status    Extra Urine Gray Tube Collected (12/06/23 1411)    Urinalysis with Reflex Microscopic and Culture In process    Urine Culture In process            Hospital Course   Patient remained HDS throughout hospital course. CT H CTA head/neck negative. MRI brain negative for acute intracranial abnormality. UDS positive for benzo and marijuana. Patient admits to taking Bromazolam, a  benzodiazepine, prior to symptoms onset, which is the likely cause of her sx and positive benzo screen. Patient was evaluated by neurology .     AMS (altered mental status)  -Delayed mentation, slurred/unclear speech, generalized weakness, impaired balance. However no focal neurological deficits noted. Patient denies falls or trauma. Denies fever chills headache. Does admit to using a drug intranasally, however later denies this.   -labs essentially unremarkable  -utox positive marijuana and benzos  -CTH and CTA negative for acute processes  -MRI brain negative for acute process or abnormality  -neurology consult appreciated     -suspect AMS 2/2 to substance use   -SW consult pending concern for substance use d/o     GI ppx   -Protonix     VTE ppx  -Ambulation       Dischareg:  -pending neurology input, consider psych consult      Discussed results labs imagine with Dr. Hilton.     Pertinent Physical Exam At Time of Discharge  Physical Exam    Outpatient Follow-Up  No future appointments.      Benedicto Levi PA-C

## 2023-12-06 NOTE — CARE PLAN
The patient's goals for the shift include      The clinical goals for the shift include ambulation      Problem: Pain - Adult  Goal: Verbalizes/displays adequate comfort level or baseline comfort level  Outcome: Met     Problem: Safety - Adult  Goal: Free from fall injury  Outcome: Met     Problem: Discharge Planning  Goal: Discharge to home or other facility with appropriate resources  Outcome: Met     Problem: Chronic Conditions and Co-morbidities  Goal: Patient's chronic conditions and co-morbidity symptoms are monitored and maintained or improved  Outcome: Met     Problem: Fall/Injury  Goal: Not fall by end of shift  Outcome: Met  Goal: Be free from injury by end of the shift  Outcome: Met  Goal: Verbalize understanding of personal risk factors for fall in the hospital  Outcome: Met  Goal: Verbalize understanding of risk factor reduction measures to prevent injury from fall in the home  Outcome: Met  Goal: Use assistive devices by end of the shift  Outcome: Met  Goal: Pace activities to prevent fatigue by end of the shift  Outcome: Met     Problem: Skin  Goal: Decreased wound size/increased tissue granulation at next dressing change  Outcome: Met  Goal: Participates in plan/prevention/treatment measures  Outcome: Met  Goal: Prevent/manage excess moisture  Outcome: Met  Goal: Prevent/minimize sheer/friction injuries  Outcome: Met  Goal: Promote/optimize nutrition  Outcome: Met  Goal: Promote skin healing  Outcome: Met

## 2023-12-06 NOTE — SIGNIFICANT EVENT
In discussion with social work patient mentioned she used a medication called Bromazolam prior to onset of symptoms which may explain presentation and positive benzo screen. CT and MRI H negative. EEG pending.     Physical Exam   Lungs CTA  Cardiac RRR

## 2023-12-06 NOTE — PROGRESS NOTES
"Mariann Dave is a 28 y.o. female on day 0 of admission presenting with AMS (altered mental status).      Subjective     Seen in the observation unit.  Still appears somnolent but responds to a limited extent to questions, in a very quiet voice that is often difficult to understand.       Objective     Last Recorded Vitals  Blood pressure 117/82, pulse 97, temperature 36.7 °C (98.1 °F), temperature source Temporal, resp. rate 18, height 1.575 m (5' 2\"), weight 71.2 kg (157 lb), SpO2 99 %.    Physical Exam  Neurological Exam    Mental status: Mildly somnolent but opens eyes to voice and makes eye contact although not sustained.  Oriented to self, initially gives the month as March and then corrects herself to December.  Oriented to 2023.  Oriented to [place].  Limited cooperation with interview.    Cranial nerves: No gaze deviation or ptosis.  Symmetric facial motor function.  Grossly intact hearing.  Extremely quiet voice but no dysarthria.    Coordination: No rest tremor, myoclonus or dystonic posturing.      Relevant Results    No EEG reading yet.                            Assessment/Plan      She appears withdrawn and only cooperative to a limited extent with interview today, but essentially oriented and appropriate.    I find no EEG reading yet.    Following with primary service.                   Eloy Levi MD  "

## 2023-12-07 LAB — BACTERIA UR CULT: ABNORMAL

## 2023-12-07 NOTE — NURSING NOTE
Mother of patient came to Nursing station with patient and stated they were leaving since discharge was taking to long.  This RN check with MONICA Pollack that stated they were waiting on neurology to sign off.  Mother of patient aware that Neurology did not signed off and still want to continue with AMA. MONICA Gilbert spoke with patient and family.  AMA form signed.

## 2023-12-12 ENCOUNTER — PATIENT OUTREACH (OUTPATIENT)
Dept: CARE COORDINATION | Facility: CLINIC | Age: 28
End: 2023-12-12
Payer: COMMERCIAL

## 2023-12-12 NOTE — PROGRESS NOTES
Outreach call to patient to support a smooth transition of care from recent admission.  Left voicemail message for patient with my contact information.    SHELIA Yanes   III  [facility]/Accountable Care Organization  Office Phone: [phone]  [email or web address]

## 2023-12-21 ENCOUNTER — PATIENT OUTREACH (OUTPATIENT)
Dept: CARE COORDINATION | Facility: CLINIC | Age: 28
End: 2023-12-21
Payer: COMMERCIAL

## 2023-12-21 NOTE — PROGRESS NOTES
2nd outreach attempt to patient to support a smooth transition of care from recent admission.  Left voicemail message for patient with my contact information.    SHELIA Yanes   III  [facility]/Accountable Care Organization  Office Phone: [phone]  [email or web address]